# Patient Record
Sex: MALE | Race: WHITE | NOT HISPANIC OR LATINO | URBAN - METROPOLITAN AREA
[De-identification: names, ages, dates, MRNs, and addresses within clinical notes are randomized per-mention and may not be internally consistent; named-entity substitution may affect disease eponyms.]

---

## 2017-10-05 ENCOUNTER — OFFICE VISIT - HEALTHEAST (OUTPATIENT)
Dept: GERIATRICS | Facility: CLINIC | Age: 67
End: 2017-10-05

## 2017-10-05 ENCOUNTER — AMBULATORY - HEALTHEAST (OUTPATIENT)
Dept: ADMINISTRATIVE | Facility: CLINIC | Age: 67
End: 2017-10-05

## 2017-10-05 DIAGNOSIS — I46.9 CARDIAC ARREST (H): ICD-10-CM

## 2017-10-05 DIAGNOSIS — E87.5 HYPERKALEMIA: ICD-10-CM

## 2017-10-05 DIAGNOSIS — J69.0 ASPIRATION PNEUMONIA, UNSPECIFIED ASPIRATION PNEUMONIA TYPE, UNSPECIFIED LATERALITY, UNSPECIFIED PART OF LUNG (H): ICD-10-CM

## 2017-10-05 DIAGNOSIS — D63.1 ANEMIA OF CHRONIC RENAL FAILURE, UNSPECIFIED STAGE: ICD-10-CM

## 2017-10-05 DIAGNOSIS — I50.33 ACUTE ON CHRONIC DIASTOLIC CONGESTIVE HEART FAILURE (H): ICD-10-CM

## 2017-10-05 DIAGNOSIS — J96.21 ACUTE ON CHRONIC RESPIRATORY FAILURE WITH HYPOXIA (H): ICD-10-CM

## 2017-10-05 DIAGNOSIS — I71.40 ABDOMINAL AORTIC ANEURYSM (AAA) WITHOUT RUPTURE (H): ICD-10-CM

## 2017-10-05 DIAGNOSIS — F10.10 ALCOHOL ABUSE: ICD-10-CM

## 2017-10-05 DIAGNOSIS — R73.9 HYPERGLYCEMIA, DRUG-INDUCED: ICD-10-CM

## 2017-10-05 DIAGNOSIS — M06.9 RHEUMATOID ARTHRITIS INVOLVING BOTH HANDS, UNSPECIFIED RHEUMATOID FACTOR PRESENCE: ICD-10-CM

## 2017-10-05 DIAGNOSIS — J95.851 VENTILATOR ASSOCIATED PNEUMONIA (H): ICD-10-CM

## 2017-10-05 DIAGNOSIS — N18.30 STAGE 3 CHRONIC KIDNEY DISEASE (H): ICD-10-CM

## 2017-10-05 DIAGNOSIS — I10 ESSENTIAL HYPERTENSION: ICD-10-CM

## 2017-10-05 DIAGNOSIS — T50.905A HYPERGLYCEMIA, DRUG-INDUCED: ICD-10-CM

## 2017-10-05 DIAGNOSIS — N18.9 ANEMIA OF CHRONIC RENAL FAILURE, UNSPECIFIED STAGE: ICD-10-CM

## 2017-10-05 DIAGNOSIS — M10.00 IDIOPATHIC GOUT: ICD-10-CM

## 2017-10-05 RX ORDER — POLYETHYLENE GLYCOL 3350 17 G/17G
17 POWDER, FOR SOLUTION ORAL DAILY
Status: SHIPPED | COMMUNITY
Start: 2017-10-05

## 2017-10-05 RX ORDER — MAGNESIUM OXIDE 400 MG/1
400 TABLET ORAL 3 TIMES DAILY
Status: SHIPPED | COMMUNITY
Start: 2017-10-05

## 2017-10-05 RX ORDER — METOPROLOL TARTRATE 50 MG
50 TABLET ORAL 2 TIMES DAILY
Status: SHIPPED | COMMUNITY
Start: 2017-10-05

## 2017-10-05 RX ORDER — SIMVASTATIN 20 MG
20 TABLET ORAL AT BEDTIME
Status: SHIPPED | COMMUNITY
Start: 2017-10-05

## 2017-10-06 ENCOUNTER — AMBULATORY - HEALTHEAST (OUTPATIENT)
Dept: ADMINISTRATIVE | Facility: CLINIC | Age: 67
End: 2017-10-06

## 2017-10-10 ENCOUNTER — OFFICE VISIT - HEALTHEAST (OUTPATIENT)
Dept: GERIATRICS | Facility: CLINIC | Age: 67
End: 2017-10-10

## 2017-10-10 DIAGNOSIS — R73.9 HYPERGLYCEMIA, DRUG-INDUCED: ICD-10-CM

## 2017-10-10 DIAGNOSIS — J96.21 ACUTE ON CHRONIC RESPIRATORY FAILURE WITH HYPOXIA (H): ICD-10-CM

## 2017-10-10 DIAGNOSIS — T50.905A HYPERGLYCEMIA, DRUG-INDUCED: ICD-10-CM

## 2017-10-10 DIAGNOSIS — J95.851 VENTILATOR ASSOCIATED PNEUMONIA (H): ICD-10-CM

## 2017-10-10 DIAGNOSIS — N18.9 ANEMIA OF CHRONIC RENAL FAILURE, UNSPECIFIED STAGE: ICD-10-CM

## 2017-10-10 DIAGNOSIS — I10 ESSENTIAL HYPERTENSION: ICD-10-CM

## 2017-10-10 DIAGNOSIS — E87.5 HYPERKALEMIA: ICD-10-CM

## 2017-10-10 DIAGNOSIS — D63.1 ANEMIA OF CHRONIC RENAL FAILURE, UNSPECIFIED STAGE: ICD-10-CM

## 2017-10-10 RX ORDER — BUMETANIDE 1 MG/1
1 TABLET ORAL DAILY
Status: SHIPPED | COMMUNITY
Start: 2017-10-10

## 2017-10-10 RX ORDER — MULTIPLE VITAMINS W/ MINERALS TAB 9MG-400MCG
1 TAB ORAL DAILY
Status: SHIPPED | COMMUNITY
Start: 2017-10-10

## 2017-10-10 RX ORDER — ALLOPURINOL 300 MG/1
450 TABLET ORAL DAILY
Status: SHIPPED | COMMUNITY
Start: 2017-10-10

## 2017-10-12 ENCOUNTER — OFFICE VISIT - HEALTHEAST (OUTPATIENT)
Dept: GERIATRICS | Facility: CLINIC | Age: 67
End: 2017-10-12

## 2017-10-12 DIAGNOSIS — I71.40 ABDOMINAL AORTIC ANEURYSM (AAA) WITHOUT RUPTURE (H): ICD-10-CM

## 2017-10-12 DIAGNOSIS — M10.00 IDIOPATHIC GOUT: ICD-10-CM

## 2017-10-12 DIAGNOSIS — M06.9 RHEUMATOID ARTHRITIS INVOLVING BOTH HANDS, UNSPECIFIED RHEUMATOID FACTOR PRESENCE: ICD-10-CM

## 2017-10-12 DIAGNOSIS — E87.5 HYPERKALEMIA: ICD-10-CM

## 2017-10-12 DIAGNOSIS — F10.10 ALCOHOL ABUSE: ICD-10-CM

## 2017-10-12 DIAGNOSIS — T50.905A HYPERGLYCEMIA, DRUG-INDUCED: ICD-10-CM

## 2017-10-12 DIAGNOSIS — N18.30 STAGE 3 CHRONIC KIDNEY DISEASE (H): ICD-10-CM

## 2017-10-12 DIAGNOSIS — I50.33 ACUTE ON CHRONIC DIASTOLIC CONGESTIVE HEART FAILURE (H): ICD-10-CM

## 2017-10-12 DIAGNOSIS — J69.0 ASPIRATION PNEUMONIA, UNSPECIFIED ASPIRATION PNEUMONIA TYPE, UNSPECIFIED LATERALITY, UNSPECIFIED PART OF LUNG (H): ICD-10-CM

## 2017-10-12 DIAGNOSIS — R73.9 HYPERGLYCEMIA, DRUG-INDUCED: ICD-10-CM

## 2017-10-12 DIAGNOSIS — J95.851 VENTILATOR ASSOCIATED PNEUMONIA (H): ICD-10-CM

## 2017-10-12 DIAGNOSIS — J96.21 ACUTE ON CHRONIC RESPIRATORY FAILURE WITH HYPOXIA (H): ICD-10-CM

## 2017-10-12 DIAGNOSIS — N18.9 ANEMIA OF CHRONIC RENAL FAILURE, UNSPECIFIED STAGE: ICD-10-CM

## 2017-10-12 DIAGNOSIS — I10 ESSENTIAL HYPERTENSION: ICD-10-CM

## 2017-10-12 DIAGNOSIS — D63.1 ANEMIA OF CHRONIC RENAL FAILURE, UNSPECIFIED STAGE: ICD-10-CM

## 2017-10-12 DIAGNOSIS — I46.9 CARDIAC ARREST (H): ICD-10-CM

## 2017-10-17 ENCOUNTER — OFFICE VISIT - HEALTHEAST (OUTPATIENT)
Dept: GERIATRICS | Facility: CLINIC | Age: 67
End: 2017-10-17

## 2017-10-17 DIAGNOSIS — J69.0 ASPIRATION PNEUMONIA, UNSPECIFIED ASPIRATION PNEUMONIA TYPE, UNSPECIFIED LATERALITY, UNSPECIFIED PART OF LUNG (H): ICD-10-CM

## 2017-10-17 DIAGNOSIS — R73.9 HYPERGLYCEMIA, DRUG-INDUCED: ICD-10-CM

## 2017-10-17 DIAGNOSIS — N18.9 ANEMIA OF CHRONIC RENAL FAILURE, UNSPECIFIED STAGE: ICD-10-CM

## 2017-10-17 DIAGNOSIS — J96.21 ACUTE ON CHRONIC RESPIRATORY FAILURE WITH HYPOXIA (H): ICD-10-CM

## 2017-10-17 DIAGNOSIS — I50.33 ACUTE ON CHRONIC DIASTOLIC CONGESTIVE HEART FAILURE (H): ICD-10-CM

## 2017-10-17 DIAGNOSIS — N18.30 STAGE 3 CHRONIC KIDNEY DISEASE (H): ICD-10-CM

## 2017-10-17 DIAGNOSIS — D63.1 ANEMIA OF CHRONIC RENAL FAILURE, UNSPECIFIED STAGE: ICD-10-CM

## 2017-10-17 DIAGNOSIS — T50.905A HYPERGLYCEMIA, DRUG-INDUCED: ICD-10-CM

## 2017-10-19 ENCOUNTER — OFFICE VISIT - HEALTHEAST (OUTPATIENT)
Dept: GERIATRICS | Facility: CLINIC | Age: 67
End: 2017-10-19

## 2017-10-19 DIAGNOSIS — I71.40 ABDOMINAL AORTIC ANEURYSM (AAA) WITHOUT RUPTURE (H): ICD-10-CM

## 2017-10-19 DIAGNOSIS — I46.9 CARDIAC ARREST (H): ICD-10-CM

## 2017-10-19 DIAGNOSIS — I50.33 ACUTE ON CHRONIC DIASTOLIC CONGESTIVE HEART FAILURE (H): ICD-10-CM

## 2017-10-19 DIAGNOSIS — D63.1 ANEMIA OF CHRONIC RENAL FAILURE, UNSPECIFIED STAGE: ICD-10-CM

## 2017-10-19 DIAGNOSIS — N18.9 ANEMIA OF CHRONIC RENAL FAILURE, UNSPECIFIED STAGE: ICD-10-CM

## 2017-10-19 DIAGNOSIS — J95.851 VENTILATOR ASSOCIATED PNEUMONIA (H): ICD-10-CM

## 2017-10-19 DIAGNOSIS — E87.5 HYPERKALEMIA: ICD-10-CM

## 2017-10-19 DIAGNOSIS — F10.10 ALCOHOL ABUSE: ICD-10-CM

## 2017-10-19 DIAGNOSIS — R73.9 HYPERGLYCEMIA, DRUG-INDUCED: ICD-10-CM

## 2017-10-19 DIAGNOSIS — M06.9 RHEUMATOID ARTHRITIS INVOLVING BOTH HANDS, UNSPECIFIED RHEUMATOID FACTOR PRESENCE: ICD-10-CM

## 2017-10-19 DIAGNOSIS — N18.30 STAGE 3 CHRONIC KIDNEY DISEASE (H): ICD-10-CM

## 2017-10-19 DIAGNOSIS — M10.00 IDIOPATHIC GOUT: ICD-10-CM

## 2017-10-19 DIAGNOSIS — I10 ESSENTIAL HYPERTENSION: ICD-10-CM

## 2017-10-19 DIAGNOSIS — T50.905A HYPERGLYCEMIA, DRUG-INDUCED: ICD-10-CM

## 2017-10-19 DIAGNOSIS — J96.21 ACUTE ON CHRONIC RESPIRATORY FAILURE WITH HYPOXIA (H): ICD-10-CM

## 2017-10-24 ENCOUNTER — OFFICE VISIT - HEALTHEAST (OUTPATIENT)
Dept: GERIATRICS | Facility: CLINIC | Age: 67
End: 2017-10-24

## 2017-10-24 DIAGNOSIS — I46.9 CARDIAC ARREST (H): ICD-10-CM

## 2017-10-24 DIAGNOSIS — J69.0 ASPIRATION PNEUMONIA, UNSPECIFIED ASPIRATION PNEUMONIA TYPE, UNSPECIFIED LATERALITY, UNSPECIFIED PART OF LUNG (H): ICD-10-CM

## 2017-10-24 DIAGNOSIS — N18.9 ANEMIA OF CHRONIC RENAL FAILURE, UNSPECIFIED STAGE: ICD-10-CM

## 2017-10-24 DIAGNOSIS — D63.1 ANEMIA OF CHRONIC RENAL FAILURE, UNSPECIFIED STAGE: ICD-10-CM

## 2017-10-24 DIAGNOSIS — N18.30 STAGE 3 CHRONIC KIDNEY DISEASE (H): ICD-10-CM

## 2017-10-24 DIAGNOSIS — I10 ESSENTIAL HYPERTENSION: ICD-10-CM

## 2017-10-25 ENCOUNTER — OFFICE VISIT - HEALTHEAST (OUTPATIENT)
Dept: GERIATRICS | Facility: CLINIC | Age: 67
End: 2017-10-25

## 2017-10-25 DIAGNOSIS — I50.33 ACUTE ON CHRONIC DIASTOLIC CONGESTIVE HEART FAILURE (H): ICD-10-CM

## 2017-10-25 DIAGNOSIS — I10 ESSENTIAL HYPERTENSION: ICD-10-CM

## 2017-10-25 DIAGNOSIS — F10.10 ALCOHOL ABUSE: ICD-10-CM

## 2017-10-25 DIAGNOSIS — N18.9 ANEMIA OF CHRONIC RENAL FAILURE, UNSPECIFIED STAGE: ICD-10-CM

## 2017-10-25 DIAGNOSIS — M10.00 IDIOPATHIC GOUT: ICD-10-CM

## 2017-10-25 DIAGNOSIS — J95.851 VENTILATOR ASSOCIATED PNEUMONIA (H): ICD-10-CM

## 2017-10-25 DIAGNOSIS — I46.9 CARDIAC ARREST (H): ICD-10-CM

## 2017-10-25 DIAGNOSIS — J69.0 ASPIRATION PNEUMONIA, UNSPECIFIED ASPIRATION PNEUMONIA TYPE, UNSPECIFIED LATERALITY, UNSPECIFIED PART OF LUNG (H): ICD-10-CM

## 2017-10-25 DIAGNOSIS — D63.1 ANEMIA OF CHRONIC RENAL FAILURE, UNSPECIFIED STAGE: ICD-10-CM

## 2017-10-25 DIAGNOSIS — M06.9 RHEUMATOID ARTHRITIS INVOLVING BOTH HANDS, UNSPECIFIED RHEUMATOID FACTOR PRESENCE: ICD-10-CM

## 2017-10-25 DIAGNOSIS — I71.40 ABDOMINAL AORTIC ANEURYSM (AAA) WITHOUT RUPTURE (H): ICD-10-CM

## 2017-10-25 DIAGNOSIS — N18.30 STAGE 3 CHRONIC KIDNEY DISEASE (H): ICD-10-CM

## 2017-10-27 ENCOUNTER — OFFICE VISIT - HEALTHEAST (OUTPATIENT)
Dept: GERIATRICS | Facility: CLINIC | Age: 67
End: 2017-10-27

## 2017-10-27 DIAGNOSIS — I10 ESSENTIAL HYPERTENSION: ICD-10-CM

## 2017-10-27 DIAGNOSIS — M10.00 IDIOPATHIC GOUT: ICD-10-CM

## 2017-10-27 DIAGNOSIS — D63.1 ANEMIA OF CHRONIC RENAL FAILURE, UNSPECIFIED STAGE: ICD-10-CM

## 2017-10-27 DIAGNOSIS — N18.9 ANEMIA OF CHRONIC RENAL FAILURE, UNSPECIFIED STAGE: ICD-10-CM

## 2017-10-27 DIAGNOSIS — N18.30 STAGE 3 CHRONIC KIDNEY DISEASE (H): ICD-10-CM

## 2017-10-27 DIAGNOSIS — I50.33 ACUTE ON CHRONIC DIASTOLIC CONGESTIVE HEART FAILURE (H): ICD-10-CM

## 2017-10-31 ENCOUNTER — OFFICE VISIT - HEALTHEAST (OUTPATIENT)
Dept: GERIATRICS | Facility: CLINIC | Age: 67
End: 2017-10-31

## 2017-10-31 DIAGNOSIS — N18.30 STAGE 3 CHRONIC KIDNEY DISEASE (H): ICD-10-CM

## 2017-10-31 DIAGNOSIS — M06.9 RHEUMATOID ARTHRITIS INVOLVING BOTH HANDS, UNSPECIFIED RHEUMATOID FACTOR PRESENCE: ICD-10-CM

## 2017-10-31 DIAGNOSIS — I10 ESSENTIAL HYPERTENSION: ICD-10-CM

## 2017-10-31 DIAGNOSIS — N18.9 ANEMIA OF CHRONIC RENAL FAILURE, UNSPECIFIED STAGE: ICD-10-CM

## 2017-10-31 DIAGNOSIS — I46.9 CARDIAC ARREST (H): ICD-10-CM

## 2017-10-31 DIAGNOSIS — D63.1 ANEMIA OF CHRONIC RENAL FAILURE, UNSPECIFIED STAGE: ICD-10-CM

## 2017-10-31 DIAGNOSIS — J69.0 ASPIRATION PNEUMONIA, UNSPECIFIED ASPIRATION PNEUMONIA TYPE, UNSPECIFIED LATERALITY, UNSPECIFIED PART OF LUNG (H): ICD-10-CM

## 2017-10-31 DIAGNOSIS — I71.40 ABDOMINAL AORTIC ANEURYSM (AAA) WITHOUT RUPTURE (H): ICD-10-CM

## 2017-10-31 DIAGNOSIS — F10.10 ALCOHOL ABUSE: ICD-10-CM

## 2017-10-31 DIAGNOSIS — M10.00 IDIOPATHIC GOUT: ICD-10-CM

## 2017-10-31 DIAGNOSIS — J95.851 VENTILATOR ASSOCIATED PNEUMONIA (H): ICD-10-CM

## 2017-10-31 DIAGNOSIS — I50.33 ACUTE ON CHRONIC DIASTOLIC CONGESTIVE HEART FAILURE (H): ICD-10-CM

## 2017-11-02 ENCOUNTER — AMBULATORY - HEALTHEAST (OUTPATIENT)
Dept: GERIATRICS | Facility: CLINIC | Age: 67
End: 2017-11-02

## 2021-05-31 ENCOUNTER — RECORDS - HEALTHEAST (OUTPATIENT)
Dept: ADMINISTRATIVE | Facility: CLINIC | Age: 71
End: 2021-05-31

## 2021-05-31 VITALS — WEIGHT: 227.6 LBS

## 2021-05-31 VITALS — WEIGHT: 220.4 LBS

## 2021-05-31 VITALS — WEIGHT: 222.8 LBS

## 2021-05-31 VITALS — WEIGHT: 223 LBS

## 2021-05-31 VITALS — WEIGHT: 223.6 LBS

## 2021-05-31 VITALS — WEIGHT: 222.6 LBS

## 2021-05-31 VITALS — WEIGHT: 221.8 LBS

## 2021-06-13 NOTE — PROGRESS NOTES
[facility]    DATE: 10/25/2017    NAME: Shai Gibbs  : 1950           MR# [id number]     CODE STATUS:  DNR      VISIT TYPE: Problem   FACILITY: [facility] [[id number]]    ROOM: 307    PRIMARY CARE PROVIDER: Alesia Primary Care Provider Phone: None Fax:[phone]    History of Present Illness:   Shai Gibbs is a 67 y.o. male with History of a cardiac arrest with prolonged bed rest now in the process of reconditioning to resume his normal activity.. Is fixed on discharge early November and although it certainly is possible that  More extensive aggressive therapy would be helpful for him more prolonged outpatient therapy also would be adequate. He denies any ongoing symptoms with the exception of lack of endurance. Both he and therapy report that he can do anything requested of him  But not over an extended period of time.. We had a Nice long discussion With regard to long-term rehabilitation to try to maximize his recovery, and he is totally committed to whatever it takes to resume walking  With the goal of no Walker after the first of the year.    Past Medical History:  Past Medical History:   Diagnosis Date     AAA (abdominal aortic aneurysm)      JEOVANNY (acute kidney injury)      Alcohol abuse      Anemia      Anemia of chronic renal failure, unspecified stage      ARDS (adult respiratory distress syndrome)      Arthritis, rheumatoid      Aspiration pneumonia      Cardiac arrest      Chronic kidney disease      Essential hypertension      Gout      Hyperglycemia, drug-induced     steroids     Hyperkalemia      Hyperlipemia      Hypotension      Idiopathic gout      Morbid obesity      Red blood cell antibody positive      Respiratory failure      Shock      Stage 3 chronic kidney disease      Ventilator associated pneumonia        Allergies:  No Known Allergies    Current Medications:  Current Outpatient Prescriptions   Medication Sig     acetaminophen (TYLENOL)  650 mg/20.3 mL Soln 650 mg by G-tube route. solution; 650 mg/20.3 mL; amt: 650 mg/20.3 mL; gastric tube  Special Instructions: Mild pain (pain scale 1-3)/ fever  Every 4 Hours - PRN; PRN 1, PRN 2, PRN 3, PRN 4, PRN 5, PRN 6     allopurinol (ZYLOPRIM) 300 MG tablet Take 450 mg by mouth daily.     bumetanide (BUMEX) 1 MG tablet Take 1 mg by mouth daily.     calcium acetate (PHOSLYRA) 667 mg (169 mg calcium)/5 mL Soln solution 1,334 mg by G-tube route 3 (three) times a day with meals. Special Instructions: Take with meals.  Three Times A Day; 08:00 AM, 12:00 PM, 05:00 PM      carboxymethylcellulose sodium (REFRESH CELLUVISC) 1 % DpGe Administer to both eyes. Special Instructions: Administer 2 drops to each eye PRN.  Every Hour - PRN; PRN 1, PRN 2, PRN 3, PRN 4, PRN 5, PRN 6, PRN 7, PRN  8, PRN 9, PRN 10, PRN 11, PRN 12, PRN 13, PRN 14, PRN 15, PRN 16,  PRN 17, PRN 18, PRN 19, PRN 20, PRN 21, PRN 22, PRN 23, PRN 24     cholecalciferol, vitamin D3, 1,000 unit tablet Take 1,000 Units by mouth daily.     cyanocobalamin 1000 MCG tablet Take 1,000 mcg by mouth daily.     docusate sodium (COLACE) 50 mg/5 mL oral liquid 100 mg by G-tube route 2 (two) times a day. docusate sodium [OTC]  liquid; 50 mg/5 mL; amt: 100 mg/10 mL; gastric tube  Twice A Day; 08:00 AM, 08:00 PM     epoetin delgado (EPOGEN,PROCRIT) 20,000 unit/mL injection Inject 7,500 Units under the skin once a week in the evening. Hold if HGB greater than 9. Check HGB once a week  For 4 weeks on Monday-     Medication once a day on Monday and Thursday     magnesium oxide (MAG-OX) 400 mg tablet 400 mg by G-tube route 3 (three) times a day.     melatonin 3 mg Tab tablet Take 6 mg by mouth at bedtime as needed.      metoprolol tartrate (LOPRESSOR) 50 MG tablet 50 mg by G-tube route 2 (two) times a day. Special Instructions: Hold for SBP <80  Twice A Day; 08:00 AM, 08:00 PM     multivitamin with minerals (THERA-M) 9 mg iron-400 mcg Tab tablet Take 1 tablet by mouth daily.      omeprazole (PRILOSEC OTC) 20 MG tablet Take 20 mg by mouth daily before breakfast.     ondansetron (ZOFRAN) 4 MG tablet Take 4 mg by mouth every 8 (eight) hours as needed for nausea.      polyethylene glycol (MIRALAX) 17 gram packet Take 17 g by mouth daily. Special Instructions: First choice for everyday PRN constipation.  Once A Day - PRN; PRN 1     potassium chloride (KLOR-CON) 20 mEq packet Take 40 mEq by mouth daily.     predniSONE (DELTASONE) 5 MG tablet Take 5 mg by mouth daily.     senna (SENOKOT) 8.6 mg tablet 2 tablets by G-tube route 2 (two) times a day.     simvastatin (ZOCOR) 20 MG tablet 20 mg by G-tube route at bedtime.       Review of Systems:  History obtained from chart review and the patient  Respiratory ROS: no cough, shortness of breath, or wheezing  Cardiovascular ROS: no chest pain or dyspnea on exertion  Gastrointestinal ROS: no abdominal pain, change in bowel habits, or black or bloody stools  Genito-Urinary ROS: no dysuria, trouble voiding, or hematuria  Musculoskeletal ROS: Gradually increasing stink although lacking endurance  Neurological ROS: no TIA or stroke symptoms  Dermatological ROS: he denies any open skin lesions         Laboratory:  No results for input(s): INR in the last 72 hours.  None    Physical Examination:  /75  Pulse (!) 115  Temp 98.7  F (37.1  C)  Resp 18  Wt 221 lb 12.8 oz (100.6 kg)  SpO2 98%  General appearance: alert, appears stated age, cooperative, flushed, no distress and moderately obese  Neck: no adenopathy, no carotid bruit, no JVD, supple, symmetrical, trachea midline and thyroid not enlarged, symmetric, no tenderness/mass/nodules  Lungs: clear to auscultation bilaterally  Heart: regular rate and rhythm, S1, S2 normal, no murmur, click, rub or gallop  Abdomen: soft, non-tender; bowel sounds normal; no masses,  no organomegaly  Extremities: extremities normal, atraumatic, no cyanosis or edema  Pulses: 2+ and symmetric  Skin: Skin color,  texture, turgor normal. No rashes or lesions  Neurologic: Mental status: Alert, oriented, thought content appropriate  Motor:full range of motion  but diminished endurance for repetitive tasks       Impression:  Shai Gibbs is a 67 y.o. male with History of   cardiac arrest and prolonged recovery now being reconditioned for life outside of transitional care    1. Cardiac arrest     2. Ventilator associated pneumonia     3. Aspiration pneumonia, unspecified aspiration pneumonia type, unspecified laterality, unspecified part of lung     4. Acute on chronic diastolic congestive heart failure     5. Idiopathic gout     6. Anemia of chronic renal failure, unspecified stage     7. Alcohol abuse     8. Rheumatoid arthritis involving both hands, unspecified rheumatoid factor presence     9. Stage 3 chronic kidney disease     10. Essential hypertension     11. Abdominal aortic aneurysm (AAA) without rupture         Plan: Continue aggressive rehabilitation while here and schedule prolonged therapy as an outpatient. Although the intensity will be lacking persistence would be helpful    Electronically signed by: Carter Andersen Sr., MD

## 2021-06-13 NOTE — PROGRESS NOTES
[facility]    DATE: 10/19/2017    NAME: Shai Gibbs  : 1950           MR# [id number]     CODE STATUS:  DNR      VISIT TYPE: Problem   FACILITY: [facility] [[id number]]    ROOM: 307    PRIMARY CARE PROVIDER: No Primary Care Provider Phone: None Fax:[phone]    History of Present Illness:   Shai Gibbs is a 67 y.o. male with History of a cardiac arrest rated to septicemia and hyperkalemia with the prolonged hospitalization. He's gradually gaining strength he volunteers today that his wife spent doing blood sugars from from home and he's been a little bit on the low side volunteering in his 80s and 110's.. We will get some blood sugars to see if his prednisone is indeed affecting his blood sugars. Overall he feels steady improvement. He said when he was constipated his pulse was up but it's back into the   low 100s at this point. He feels he's eating and drinking well and vital signs as noted below are certainly adequate with the exception of the tachycardia..  Recheck today show his shows his pulse  At 103,And this during exercise.    Past Medical History:  Past Medical History:   Diagnosis Date     AAA (abdominal aortic aneurysm)      JEOVANNY (acute kidney injury)      Alcohol abuse      Anemia      Anemia of chronic renal failure, unspecified stage      ARDS (adult respiratory distress syndrome)      Arthritis, rheumatoid      Aspiration pneumonia      Cardiac arrest      Chronic kidney disease      Essential hypertension      Gout      Hyperglycemia, drug-induced     steroids     Hyperkalemia      Hyperlipemia      Hypotension      Idiopathic gout      Morbid obesity      Red blood cell antibody positive      Respiratory failure      Shock      Stage 3 chronic kidney disease      Ventilator associated pneumonia        Allergies:  No Known Allergies    Current Medications:  Current Outpatient Prescriptions   Medication Sig     acetaminophen (TYLENOL) 650  mg/20.3 mL Soln 650 mg by G-tube route. solution; 650 mg/20.3 mL; amt: 650 mg/20.3 mL; gastric tube  Special Instructions: Mild pain (pain scale 1-3)/ fever  Every 4 Hours - PRN; PRN 1, PRN 2, PRN 3, PRN 4, PRN 5, PRN 6     allopurinol (ZYLOPRIM) 300 MG tablet Take 450 mg by mouth daily.     bumetanide (BUMEX) 1 MG tablet Take 1 mg by mouth daily.     calcium acetate (PHOSLYRA) 667 mg (169 mg calcium)/5 mL Soln solution 1,334 mg by G-tube route 3 (three) times a day with meals. Special Instructions: Take with meals.  Three Times A Day; 08:00 AM, 12:00 PM, 05:00 PM      carboxymethylcellulose sodium (REFRESH CELLUVISC) 1 % DpGe Administer to both eyes. Special Instructions: Administer 2 drops to each eye PRN.  Every Hour - PRN; PRN 1, PRN 2, PRN 3, PRN 4, PRN 5, PRN 6, PRN 7, PRN  8, PRN 9, PRN 10, PRN 11, PRN 12, PRN 13, PRN 14, PRN 15, PRN 16,  PRN 17, PRN 18, PRN 19, PRN 20, PRN 21, PRN 22, PRN 23, PRN 24     cholecalciferol, vitamin D3, 1,000 unit tablet Take 1,000 Units by mouth daily.     cyanocobalamin 1000 MCG tablet Take 1,000 mcg by mouth daily.     docusate sodium (COLACE) 50 mg/5 mL oral liquid 100 mg by G-tube route 2 (two) times a day. docusate sodium [OTC]  liquid; 50 mg/5 mL; amt: 100 mg/10 mL; gastric tube  Twice A Day; 08:00 AM, 08:00 PM     epoetin delgado (EPOGEN,PROCRIT) 20,000 unit/mL injection Inject 7,500 Units under the skin once a week in the evening. Hold if HGB greater than 9. Check HGB once a week  For 4 weeks on Monday-     Medication once a day on Monday and Thursday     hypromellose (NATURES TEARS) Drop 1 drop 2 (two) times a day as needed.     magnesium oxide (MAG-OX) 400 mg tablet 400 mg by G-tube route 3 (three) times a day.     melatonin 3 mg Tab tablet Take 6 mg by mouth at bedtime as needed.      metoprolol tartrate (LOPRESSOR) 50 MG tablet 50 mg by G-tube route 2 (two) times a day. Special Instructions: Hold for SBP <80  Twice A Day; 08:00 AM, 08:00 PM     multivitamin with  minerals (THERA-M) 9 mg iron-400 mcg Tab tablet Take 1 tablet by mouth daily.     omeprazole (PRILOSEC OTC) 20 MG tablet Take 20 mg by mouth daily before breakfast.     ondansetron (ZOFRAN) 4 MG tablet Take 4 mg by mouth every 8 (eight) hours as needed for nausea.      oxyCODONE (ROXICODONE) 5 MG immediate release tablet 10 mg by G-tube route every 4 (four) hours as needed for pain. tablet; 5 mg; amt: 10 mg; gastric tube  Special Instructions: For moderate to severe pain (pain scale 4-10)  Every 4 Hours - PRN; PRN 1, PRN 2, PRN 3, PRN 4, PRN 5, PRN 6     polyethylene glycol (MIRALAX) 17 gram packet Take 17 g by mouth daily. Special Instructions: First choice for everyday PRN constipation.  Once A Day - PRN; PRN 1     potassium chloride (KLOR-CON) 20 mEq packet Take 40 mEq by mouth daily.     predniSONE (DELTASONE) 10 mg tablet Take 10 mg by mouth daily.     [START ON 10/20/2017] predniSONE (DELTASONE) 5 MG tablet Take 5 mg by mouth daily.     senna (SENOKOT) 8.6 mg tablet 2 tablets by G-tube route 2 (two) times a day.     simvastatin (ZOCOR) 20 MG tablet 20 mg by G-tube route at bedtime.       Review of Systems:  History obtained from chart review and the patient  Respiratory ROS: no cough, shortness of breath, or wheezing  Cardiovascular ROS: no chest pain or dyspnea on exertion  Gastrointestinal ROS: no abdominal pain, change in bowel habits, or black or bloody stools  Genito-Urinary ROS: no dysuria, trouble voiding, or hematuria  Musculoskeletal ROS: Greatly improved strength with steady improvement as the days go on  Neurological ROS: no TIA or stroke symptoms  Dermatological ROS: denies open skin lesions         Laboratory:  No results for input(s): INR in the last 72 hours.  None but will do some random blood sugars over the weekend    Physical Examination:  /76  Pulse (!) 127  Temp 98.1  F (36.7  C)  Resp 18  Wt (!) 222 lb 9.6 oz (101 kg)  SpO2 96%  General appearance: alert, appears stated age,  cooperative, flushed, no distress and mildly obese  Neck: no adenopathy, no carotid bruit, no JVD, supple, symmetrical, trachea midline and thyroid not enlarged, symmetric, no tenderness/mass/nodules  Chest is clear to auscultation and percussion heart regular without murmur abdomen obese nontender no hepatomegaly Or splenomegaly normal bowel soundsExtremities show good peripheral pulses full range of motion although globally diminished strength he is quite alert oriented and appropriate    Impression:  Shai Gibbs is a 67 y.o. male with Prolonged recovery after cardiac arrest related to septicemia and hyperkalemia    1. Cardiac arrest     2. Stage 3 chronic kidney disease     3. Acute on chronic respiratory failure with hypoxia     4. Hyperkalemia     5. Idiopathic gout     6. Ventilator associated pneumonia     7. Abdominal aortic aneurysm (AAA) without rupture     8. Anemia of chronic renal failure, unspecified stage     9. Essential hypertension     10. Rheumatoid arthritis involving both hands, unspecified rheumatoid factor presence     11. Hyperglycemia, drug-induced     12. Acute on chronic diastolic congestive heart failure     13. Alcohol abuse         Plan: Continue to push for physical activity is weaning off the steroids which I think is a good plan and we will keep track of his blood sugars. His hope is for November 1 discharge and my hope is that we can help them achieve that goal    Electronically signed by: Carter Andersen Sr., MD

## 2021-06-13 NOTE — PROGRESS NOTES
[facility]    Facility:   [facility] [[id number]]   Code Status: DNR      CHIEF COMPLAINT/REASON FOR VISIT:  Chief Complaint   Patient presents with     Review Of Multiple Medical Conditions       HISTORY:      HPI: Shai Gibbs is a 67 y.o. male with history of septicemia related to urinary tract infection which led to adult respiratory distress syndrome cardiac arrest hyperkalemia and approximately a two month hospitalization..  He has recovered sufficiently from the acute issues to be admitted to the transitional care unit back in [place] as opposed to [place] where he was on vacation. He is a retired  and  who worked for [employer]. He is planning on early discharge hopefully and planning on being at a family wedding mid-November. He has been through renal failure with constant then intermittent and now discontinued dialysis. He had respiratory failure regarding tracheostomy which is now been removed and incision is healed.  He has a feeding gastrostomy left in place but is almost entirely on oral intake and getting supplements at night. His appetite has improved and he is being followed by the dieticain. He  currently completed  a 3 day calorie count.  Today he is seen in his room. He denies CP or SOB. He is making progress in therapy. His fingersticks have been reviewed and have been stable on the prednisone taper. Lantus was dc'd and FS have been stable and stopped. Per therapy he is on target for discharging early November.  BP and pulses reviewed and he has been tachy prior to metoprolol and coming down to the high 90's low 100's, BP;s have been low, He denies CP.  Past Medical History:   Diagnosis Date     AAA (abdominal aortic aneurysm)      JEOVANNY (acute kidney injury)      Alcohol abuse      Anemia      Anemia of chronic renal failure, unspecified stage      ARDS (adult respiratory distress syndrome)      Arthritis, rheumatoid       Aspiration pneumonia      Cardiac arrest      Chronic kidney disease      Essential hypertension      Gout      Hyperglycemia, drug-induced     steroids     Hyperkalemia      Hyperlipemia      Hypotension      Idiopathic gout      Morbid obesity      Red blood cell antibody positive      Respiratory failure      Shock      Stage 3 chronic kidney disease      Ventilator associated pneumonia              Family History   Problem Relation Age of Onset     Family history unknown: Yes     Social History     Social History     Marital status:      Spouse name: N/A     Number of children: N/A     Years of education: N/A     Occupational History     [employer]      Retired,  and manager     Social History Main Topics     Smoking status: Never Smoker     Smokeless tobacco: Never Used     Alcohol use No     Drug use: Not on file     Sexual activity: Not on file     Other Topics Concern     Not on file     Social History Narrative     with 3 adult children.  Adult son lives with father and mother in split level home with 12 steps.         Review of Systems   Constitutional: Positive for activity change and fatigue. Negative for appetite change, chills and fever.        Completed a 3 day calorie count- appetite improving, Continues on supplement   HENT: Negative for congestion and sore throat.    Eyes: Negative for visual disturbance.   Respiratory: Negative for cough, shortness of breath and wheezing.    Cardiovascular: Negative for chest pain and leg swelling.   Gastrointestinal: Negative for abdominal distention, abdominal pain, constipation, diarrhea and nausea.   Genitourinary: Negative for dysuria.   Musculoskeletal: Positive for arthralgias and joint swelling. Negative for myalgias.   Skin: Negative for color change, rash and wound.   Neurological: Negative for dizziness, weakness and numbness.   Psychiatric/Behavioral: Negative for agitation, behavioral problems and sleep disturbance.        .  Vitals:    10/17/17 1801   BP: 110/74   Pulse: 97   Resp: 19   Temp: 98.7  F (37.1  C)   SpO2: 95%   Weight: (!) 222 lb 9.6 oz (101 kg)       Physical Exam   Constitutional: He is oriented to person, place, and time. He appears well-developed and well-nourished.   HENT:   Head: Normocephalic.   Eyes: Conjunctivae are normal.   Neck: Normal range of motion.   Cardiovascular: Normal rate, regular rhythm and normal heart sounds.    No murmur heard.  Pulmonary/Chest: Breath sounds normal. No respiratory distress. He has no wheezes. He has no rales.   Abdominal: Soft. Bowel sounds are normal. He exhibits no distension. There is no tenderness.   Musculoskeletal: He exhibits no edema.   Decreased ROM right upper extremity due to a bad shoulder and arthritis   Neurological: He is alert and oriented to person, place, and time.   Weaker grasp on the right - arthritis.    Skin: Skin is warm.   Healed right chest incision from removal of port a cath  Healed neck incision from tracheostomy  Bruising abdomen   Psychiatric: He has a normal mood and affect. His behavior is normal.         LABS:   HGB 9.1  10/9/17-hgb 8.8  10/9/17  Albumin-2.7 down from 2.9  GFR-36-on 9/20/17 it was 12    Current Outpatient Prescriptions   Medication Sig     acetaminophen (TYLENOL) 650 mg/20.3 mL Soln 650 mg by G-tube route. solution; 650 mg/20.3 mL; amt: 650 mg/20.3 mL; gastric tube  Special Instructions: Mild pain (pain scale 1-3)/ fever  Every 4 Hours - PRN; PRN 1, PRN 2, PRN 3, PRN 4, PRN 5, PRN 6     allopurinol (ZYLOPRIM) 300 MG tablet Take 450 mg by mouth daily.     bumetanide (BUMEX) 1 MG tablet Take 1 mg by mouth daily.     calcium acetate (PHOSLYRA) 667 mg (169 mg calcium)/5 mL Soln solution 1,334 mg by G-tube route 3 (three) times a day with meals. Special Instructions: Take with meals.  Three Times A Day; 08:00 AM, 12:00 PM, 05:00 PM      carboxymethylcellulose sodium (REFRESH CELLUVISC) 1 % DpGe Administer to both eyes. Special  Instructions: Administer 2 drops to each eye PRN.  Every Hour - PRN; PRN 1, PRN 2, PRN 3, PRN 4, PRN 5, PRN 6, PRN 7, PRN  8, PRN 9, PRN 10, PRN 11, PRN 12, PRN 13, PRN 14, PRN 15, PRN 16,  PRN 17, PRN 18, PRN 19, PRN 20, PRN 21, PRN 22, PRN 23, PRN 24     cholecalciferol, vitamin D3, 1,000 unit tablet Take 1,000 Units by mouth daily.     docusate sodium (COLACE) 50 mg/5 mL oral liquid 100 mg by G-tube route 2 (two) times a day. docusate sodium [OTC]  liquid; 50 mg/5 mL; amt: 100 mg/10 mL; gastric tube  Twice A Day; 08:00 AM, 08:00 PM     epoetin delgado (EPOGEN,PROCRIT) 20,000 unit/mL injection Inject 7,500 Units under the skin once a week in the evening. Hold if HGB greater than 9. Check HGB once a week  For 4 weeks on Monday-     Medication once a day on Monday and Thursday     magnesium oxide (MAG-OX) 400 mg tablet 400 mg by G-tube route 3 (three) times a day.     melatonin 3 mg Tab tablet Take 6 mg by mouth at bedtime as needed.      metoprolol tartrate (LOPRESSOR) 50 MG tablet 50 mg by G-tube route 2 (two) times a day. Special Instructions: Hold for SBP <80  Twice A Day; 08:00 AM, 08:00 PM     multivitamin with minerals (THERA-M) 9 mg iron-400 mcg Tab tablet Take 1 tablet by mouth daily.     omeprazole (PRILOSEC OTC) 20 MG tablet Take 20 mg by mouth daily before breakfast.     ondansetron (ZOFRAN) 4 MG tablet Take 4 mg by mouth every 8 (eight) hours as needed for nausea.      oxyCODONE (ROXICODONE) 5 MG immediate release tablet 10 mg by G-tube route every 4 (four) hours as needed for pain. tablet; 5 mg; amt: 10 mg; gastric tube  Special Instructions: For moderate to severe pain (pain scale 4-10)  Every 4 Hours - PRN; PRN 1, PRN 2, PRN 3, PRN 4, PRN 5, PRN 6     polyethylene glycol (MIRALAX) 17 gram packet Take 17 g by mouth daily. Special Instructions: First choice for everyday PRN constipation.  Once A Day - PRN; PRN 1     polyvinyl alcohol (LIQUIFILM TEARS) 1.4 % ophthalmic solution Administer 1 drop to both  eyes 2 (two) times a day as needed for dry eyes.     potassium chloride (KLOR-CON) 20 mEq packet Take 40 mEq by mouth daily.     predniSONE (DELTASONE) 10 mg tablet Take 10 mg by mouth daily.     [START ON 10/20/2017] predniSONE (DELTASONE) 5 MG tablet Take 5 mg by mouth daily.     senna (SENOKOT) 8.6 mg tablet 2 tablets by G-tube route 2 (two) times a day.     simvastatin (ZOCOR) 20 MG tablet 20 mg by G-tube route at bedtime.     hypromellose (NATURES TEARS) Drop 1 drop 2 (two) times a day as needed.     ASSESSMENT:      ICD-10-CM    1. Hyperglycemia, drug-induced R73.9     T50.905A    2. Anemia of chronic renal failure, unspecified stage N18.9     D63.1    3. Aspiration pneumonia, unspecified aspiration pneumonia type, unspecified laterality, unspecified part of lung J69.0    4. Acute on chronic diastolic congestive heart failure I50.33    5. Stage 3 chronic kidney disease N18.3    6. Acute on chronic respiratory failure with hypoxia J96.21        PLAN:    Continue to optimize therapy  Anemia weekly HGB,  on Epogen for HGB < 9 twice weekly  Cognition appeared intact- Pt to have cognitive testing in facility.   HTN stable on metoprolol BP's stable but pt tachycardia  Hyperglycemia due to prednisone. BS have been stable- Katherine avina'd and will continue to monitor have been stable on prednisone.    Electronically signed by: Alice Ashton CNP

## 2021-06-13 NOTE — PROGRESS NOTES
[facility]    Facility:   [facility] [[id number]]   Code Status: DNR      CHIEF COMPLAINT/REASON FOR VISIT:  Chief Complaint   Patient presents with     Review Of Multiple Medical Conditions     tf, acute kidney failure,        HISTORY:      HPI: Shai Gibbs is a 67 y.o. male with history of septicemia related to urinary tract infection which led to adult respiratory distress syndrome cardiac arrest hyperkalemia and approximately a two month hospitalization..  He has recovered sufficiently from the acute issues to be admitted to the transitional care unit back in [place] as opposed to [place] where he was on vacation. He is a retired  and  who worked for [employer]. He is planning on early discharge hopefully and planning on being at a family wedding mid-November. He has been through renal failure with constant then intermittent and now discontinued dialysis. He had respiratory failure regarding tracheostomy which is now been removed and incision is healed.  He has a feeding gastrostomy left in place but is almost entirely on oral intake and getting supplements at night. There is inconsistency in his intake from 25%-100%. He is currently on a 3 day calorie count.  Today he is seen in his room. He denies CP or SOB. He is making progress in therapy. His fingersticks have been reviewed and have been stable on the prednisone taper. Katherine was dc'd and I will monitor FS x 3days.   Past Medical History:   Diagnosis Date     AAA (abdominal aortic aneurysm)      JEOVANNY (acute kidney injury)      Alcohol abuse      Anemia      Anemia of chronic renal failure, unspecified stage      ARDS (adult respiratory distress syndrome)      Arthritis, rheumatoid      Aspiration pneumonia      Cardiac arrest      Chronic kidney disease      Essential hypertension      Gout      Hyperglycemia, drug-induced     steroids     Hyperkalemia      Hyperlipemia      Hypotension       Idiopathic gout      Morbid obesity      Red blood cell antibody positive      Respiratory failure      Shock      Stage 3 chronic kidney disease      Ventilator associated pneumonia              Family History   Problem Relation Age of Onset     Family history unknown: Yes     Social History     Social History     Marital status:      Spouse name: N/A     Number of children: N/A     Years of education: N/A     Occupational History     [employer]      Retired,  and manager     Social History Main Topics     Smoking status: Never Smoker     Smokeless tobacco: Never Used     Alcohol use No     Drug use: Not on file     Sexual activity: Not on file     Other Topics Concern     Not on file     Social History Narrative     with 3 adult children.  Adult son lives with father and mother in split level home with 12 steps.         Review of Systems   Constitutional: Positive for activity change and fatigue. Negative for appetite change, chills and fever.        Inconsistent with oral intake- placed on a 3 day calorie count   HENT: Negative for congestion and sore throat.    Eyes: Negative for visual disturbance.   Respiratory: Negative for cough, shortness of breath and wheezing.    Cardiovascular: Negative for chest pain and leg swelling.   Gastrointestinal: Negative for abdominal distention, abdominal pain, constipation, diarrhea and nausea.   Genitourinary: Negative for dysuria.   Musculoskeletal: Positive for arthralgias and joint swelling. Negative for myalgias.   Skin: Negative for color change, rash and wound.   Neurological: Negative for dizziness, weakness and numbness.   Psychiatric/Behavioral: Negative for agitation, behavioral problems and sleep disturbance.       .  Vitals:    10/10/17 1811   BP: 134/84   Pulse: (!) 104   Resp: 19   Temp: 97.3  F (36.3  C)   SpO2: 97%   Weight: (!) 223 lb 9.6 oz (101.4 kg)       Physical Exam   Constitutional: He is oriented to person, place, and  time. He appears well-developed and well-nourished.   HENT:   Head: Normocephalic.   Eyes: Conjunctivae are normal.   Neck: Normal range of motion.   Cardiovascular: Normal rate, regular rhythm and normal heart sounds.    No murmur heard.  Pulmonary/Chest: Breath sounds normal. No respiratory distress. He has no wheezes. He has no rales.   Abdominal: Soft. Bowel sounds are normal. He exhibits no distension. There is no tenderness.   Musculoskeletal: He exhibits no edema.   Decreased ROM right upper extremity due to a bad shoulder and arthritis   Neurological: He is alert and oriented to person, place, and time.   Weaker grasp on the right - arthritis.    Skin: Skin is warm.   Healed right chest incision from removal of port a cath  Healed neck incision from tracheostomy  Bruising abdomen   Psychiatric: He has a normal mood and affect. His behavior is normal.         LABS:   10/9/17-hgb 8.8  10/9/17  Albumin-2.9  GFR-36-on 9/20/17 it was 12    ASSESSMENT:      ICD-10-CM    1. Acute on chronic respiratory failure with hypoxia J96.21    2. Ventilator associated pneumonia J95.851    3. Anemia of chronic renal failure, unspecified stage N18.9     D63.1    4. Essential hypertension I10    5. Hyperglycemia, drug-induced R73.9     T50.905A    6. Hyperkalemia E87.5        PLAN:    Continue to optimize therapy  Anemia weekly HGB,  on Epogen for HGB < 9 twice weekly  Cognition appeared intact- Pt to have cognitive testing in facility.   HTN stable on metoprolol  Hyperglycemia due to prednisone. BS have been stable- Katherine yates and will continue to monitor BS    Electronically signed by: Alice Ashton CNP

## 2021-06-13 NOTE — PROGRESS NOTES
[facility]    DATE: 10/12/2017  NAME: Shai Gibbs  : 1950           MR# [id number]     CODE STATUS:  DNR    VISIT TYPE: Problem  FACILITY: [facility] [[id number]]        ROOM: 307  PRIMARY CARE PROVIDER: No Primary Care Provider Phone: None Fax:[phone]     History Course:  Shai Gibbs is a 67 y.o. male with A history of septicemia related to a urinary tract infection which resulted in Cardiac arrest arrest from Hyperkalemia and a two-month prolonged hospitalization after near-death.  He is gaining strength steadily at this point and is trying hard for discharge by . His voice is stronger his oral intake is sufficient to discontinue the gastrostomy feeding although is only had the gastrostomy tube for four weeks. His strength is steadily improving. He is quite clear about his plans  And necessity for modifying his  Negative behaviors that may have contributed to his prolonged hospitalization    Past Medical History:  Past Medical History:   Diagnosis Date     AAA (abdominal aortic aneurysm)      JEOVANNY (acute kidney injury)      Alcohol abuse      Anemia      Anemia of chronic renal failure, unspecified stage      ARDS (adult respiratory distress syndrome)      Arthritis, rheumatoid      Aspiration pneumonia      Cardiac arrest      Chronic kidney disease      Essential hypertension      Gout      Hyperglycemia, drug-induced     steroids     Hyperkalemia      Hyperlipemia      Hypotension      Idiopathic gout      Morbid obesity      Red blood cell antibody positive      Respiratory failure      Shock      Stage 3 chronic kidney disease      Ventilator associated pneumonia        Allergies:  No Known Allergies    Discharge Medications:  Current Outpatient Prescriptions   Medication Sig     acetaminophen (TYLENOL) 650 mg/20.3 mL Soln 650 mg by G-tube route. solution; 650 mg/20.3 mL; amt: 650 mg/20.3 mL; gastric tube  Special Instructions: Mild pain  (pain scale 1-3)/ fever  Every 4 Hours - PRN; PRN 1, PRN 2, PRN 3, PRN 4, PRN 5, PRN 6     allopurinol (ZYLOPRIM) 300 MG tablet Take 450 mg by mouth daily.     bumetanide (BUMEX) 1 MG tablet Take 1 mg by mouth daily.     calcium acetate (PHOSLYRA) 667 mg (169 mg calcium)/5 mL Soln solution 1,334 mg by G-tube route 3 (three) times a day with meals. Special Instructions: Take with meals.  Three Times A Day; 08:00 AM, 12:00 PM, 05:00 PM      carboxymethylcellulose sodium (REFRESH CELLUVISC) 1 % DpGe Administer to both eyes. Special Instructions: Administer 2 drops to each eye PRN.  Every Hour - PRN; PRN 1, PRN 2, PRN 3, PRN 4, PRN 5, PRN 6, PRN 7, PRN  8, PRN 9, PRN 10, PRN 11, PRN 12, PRN 13, PRN 14, PRN 15, PRN 16,  PRN 17, PRN 18, PRN 19, PRN 20, PRN 21, PRN 22, PRN 23, PRN 24     cholecalciferol, vitamin D3, 1,000 unit tablet Take 1,000 Units by mouth daily.     colchicine 0.6 mg tablet Take 0.6 mg by mouth daily. Once a day on Monday     docusate sodium (COLACE) 50 mg/5 mL oral liquid 100 mg by G-tube route 2 (two) times a day. docusate sodium [OTC]  liquid; 50 mg/5 mL; amt: 100 mg/10 mL; gastric tube  Twice A Day; 08:00 AM, 08:00 PM     epoetin delgado (EPOGEN,PROCRIT) 20,000 unit/mL injection Inject 7,500 Units under the skin once a week in the evening. Hold if HGB greater than 9. Check HGB once a week  For 4 weeks on Monday-     Medication once a day on Monday and Thursday     hypromellose (NATURES TEARS) Drop 1 drop 2 (two) times a day as needed.     magnesium oxide (MAG-OX) 400 mg tablet 400 mg by G-tube route 3 (three) times a day.     melatonin 3 mg Tab tablet Take 6 mg by mouth at bedtime as needed.      metoprolol tartrate (LOPRESSOR) 50 MG tablet 50 mg by G-tube route 2 (two) times a day. Special Instructions: Hold for SBP <80  Twice A Day; 08:00 AM, 08:00 PM     multivitamin with minerals (THERA-M) 9 mg iron-400 mcg Tab tablet Take 1 tablet by mouth daily.     omeprazole (PRILOSEC OTC) 20 MG tablet Take 20  mg by mouth daily before breakfast.     ondansetron (ZOFRAN) 4 MG tablet Take 4 mg by mouth every 8 (eight) hours as needed for nausea.      oxyCODONE (ROXICODONE) 5 MG immediate release tablet 10 mg by G-tube route every 4 (four) hours as needed for pain. tablet; 5 mg; amt: 10 mg; gastric tube  Special Instructions: For moderate to severe pain (pain scale 4-10)  Every 4 Hours - PRN; PRN 1, PRN 2, PRN 3, PRN 4, PRN 5, PRN 6     polyethylene glycol (MIRALAX) 17 gram packet Take 17 g by mouth daily. Special Instructions: First choice for everyday PRN constipation.  Once A Day - PRN; PRN 1     potassium chloride (KLOR-CON) 20 mEq packet Take 40 mEq by mouth daily.     [START ON 10/13/2017] predniSONE (DELTASONE) 10 mg tablet Take 10 mg by mouth daily.     predniSONE (DELTASONE) 20 MG tablet Take 20 mg by mouth daily.     [START ON 10/20/2017] predniSONE (DELTASONE) 5 MG tablet Take 5 mg by mouth daily.     senna (SENOKOT) 8.6 mg tablet 2 tablets by G-tube route 2 (two) times a day.     simvastatin (ZOCOR) 20 MG tablet 20 mg by G-tube route at bedtime.       Review of Systems:  History obtained from chart review and the patient  Respiratory ROS: no cough, shortness of breath, or wheezing  Cardiovascular ROS: no chest pain or dyspnea on exertion  Gastrointestinal ROS: no abdominal pain, change in bowel habits, or black or bloody stools  Genito-Urinary ROS: no dysuria, trouble voiding, or hematuria  Musculoskeletal ROS: Decreased stamina but symmetrical strength and tone  Neurological ROS: no TIA or stroke symptoms  Dermatological ROS: no open skin lesions are acknowledged       Physical Examination:  /70  Pulse (!) 107  Temp 98.6  F (37  C)  Resp 18  Wt (!) 227 lb 9.6 oz (103.2 kg)  SpO2 99%  General appearance: alert, appears stated age, cooperative, fatigued, flushed, no distress and moderately obese  Neck: no adenopathy, no carotid bruit, no JVD, supple, symmetrical, trachea midline and thyroid not  enlarged, symmetric, no tenderness/mass/nodules  Lungs: clear to auscultation bilaterally  Heart: regular rate and rhythm, S1, S2 normal, no murmur, click, rub or gallop  Abdomen: soft, non-tender; bowel sounds normal; no masses,  no organomegaly and obese With feeding gastrostomy still in place  Extremities: extremities normal, atraumatic, no cyanosis or edema  Pulses: 2+ and symmetric  Skin: clean skin around gastrostomy opening  Neurologic: Mental status: Alert, oriented, thought content appropriate, but somewhat emotional  Motor:globally diminished but steadily increasing strength       Laboratory:None            Diagnosis:  Shai Gibbs is a 67 y.o. male with A recent bout of cardiac arrest related to infection and renal failure    1. Cardiac arrest     2. Stage 3 chronic kidney disease     3. Acute on chronic respiratory failure with hypoxia     4. Hyperkalemia     5. Idiopathic gout     6. Acute on chronic diastolic congestive heart failure     7. Ventilator associated pneumonia     8. Abdominal aortic aneurysm (AAA) without rupture     9. Aspiration pneumonia, unspecified aspiration pneumonia type, unspecified laterality, unspecified part of lung     10. Alcohol abuse     11. Anemia of chronic renal failure, unspecified stage     12. Essential hypertension     13. Rheumatoid arthritis involving both hands, unspecified rheumatoid factor presence     14. Hyperglycemia, drug-induced           Plan: Continue aggressive rehabilitation. I believe his desired  Discharge date of early November is feasible    Electronically signed by: Carter Andersen Sr., MD

## 2021-06-13 NOTE — PROGRESS NOTES
[facility]    DATE: 10/31/2017  NAME: Shai Gibbs  : 1950           MR# [id number]     CODE STATUS:  DNR    VISIT TYPE: Discharge   FACILITY: [facility] [[id number]]        ROOM: 307  PRIMARY CARE PROVIDER: No Primary Care Provider Phone: None Fax:[phone]     History Course:  Shai Gibbs is a 67 y.o. male with A history of  Cardiac arrest and prolonged bed rest associated with transient renal failure necessitating dialysis. After a three month recovery. He is now regained enough strength to care for himself at home although he still has his gastrostomy feeding tube which will be removed now nine weeks post placement. He is off dialysis but needs to be followed by nephrology with regard to his image and use his calcium acetate use  And His Bumetanide use. He reports he has an appointment with his primary will help him arrange his follow-up visits. Our nursing service is working on formal arrangements for removal of the gastrostomy and nephrology follow-up. Shai bonilla always is upbeat and is relieved to be able to go home tomorrow although is cognizant of how far he's come  From near-death in [place].  Past Medical History:  Past Medical History:   Diagnosis Date     AAA (abdominal aortic aneurysm)      JEOVANNY (acute kidney injury)      Alcohol abuse      Anemia      Anemia of chronic renal failure, unspecified stage      ARDS (adult respiratory distress syndrome)      Arthritis, rheumatoid      Aspiration pneumonia      Cardiac arrest      Chronic kidney disease      Essential hypertension      Gout      Hyperglycemia, drug-induced     steroids     Hyperkalemia      Hyperlipemia      Hypotension      Idiopathic gout      Morbid obesity      Red blood cell antibody positive      Respiratory failure      Shock      Stage 3 chronic kidney disease      Ventilator associated pneumonia        Allergies:  No Known Allergies    Discharge Medications:  Current  Outpatient Prescriptions   Medication Sig     allopurinol (ZYLOPRIM) 300 MG tablet Take 450 mg by mouth daily.     bumetanide (BUMEX) 1 MG tablet Take 1 mg by mouth daily.     calcium acetate (PHOSLYRA) 667 mg (169 mg calcium)/5 mL Soln solution Take 1,334 mg by mouth 3 (three) times a day with meals. Special Instructions: Take with meals.  Three Times A Day; 08:00 AM, 12:00 PM, 05:00 PM      cholecalciferol, vitamin D3, 1,000 unit tablet Take 1,000 Units by mouth daily.     cyanocobalamin 1000 MCG tablet Take 1,000 mcg by mouth daily.     magnesium oxide (MAG-OX) 400 mg tablet Take 400 mg by mouth 3 (three) times a day.      metoprolol tartrate (LOPRESSOR) 50 MG tablet Take 50 mg by mouth 2 (two) times a day. Special Instructions: Hold for SBP <80  Twice A Day; 08:00 AM, 08:00 PM      multivitamin with minerals (THERA-M) 9 mg iron-400 mcg Tab tablet Take 1 tablet by mouth daily.     polyethylene glycol (MIRALAX) 17 gram packet Take 17 g by mouth daily. Special Instructions: First choice for everyday PRN constipation.  Once A Day - PRN; PRN 1     potassium chloride (KLOR-CON) 20 mEq packet Take 40 mEq by mouth daily.     simvastatin (ZOCOR) 20 MG tablet Take 20 mg by mouth at bedtime.        Review of Systems:  History obtained from chart review and the patient  Respiratory ROS: no cough, shortness of breath, or wheezing  Cardiovascular ROS: no chest pain or dyspnea on exertion  Gastrointestinal ROS: no abdominal pain, change in bowel habits, or black or bloody stools  Genito-Urinary ROS: no dysuria, trouble voiding, or hematuria  Musculoskeletal ROS: Generally weak especially lower extremities but steady improvement although he has a residual tachycardia suggesting weakness  Neurological ROS: no TIA or stroke symptoms  Dermatological ROS: denies active skin lesions       Physical Examination:  /78  Pulse (!) 113  Temp 98.5  F (36.9  C)  Resp 20  Wt 220 lb 6.4 oz (100 kg)  SpO2 97%  General appearance:  alert, appears stated age, cooperative, flushed, no distress and mildly obese  Neck: no adenopathy, no carotid bruit, no JVD, supple, symmetrical, trachea midline and thyroid not enlarged, symmetric, no tenderness/mass/nodules  Lungs: clear to auscultation bilaterally  Heart: regular rate and rhythm, S1, S2 normal, no murmur, click, rub or gallop  Abdomen: obese normal bowel sounds  Extremities: extremities normal, atraumatic, no cyanosis or edema Still residual lower extremity  Lack of endurance although briefly he is able to move  Pulses: 2+ and symmetric  Skin: Skin color, texture, turgor normal. No rashes or lesions  Neurologic: Mental status: Alert, oriented, thought content appropriate  Motor:diminish drinks especially lower extremities       Laboratory:None          Discharge Diagnosis:  Shai Gibbs is a 67 y.o. male with History of cardiac arrest and renal failure requiring dialysis now after prolonged recovery.  Re-approaching normal function outside of care facility    1. Cardiac arrest     2. Ventilator associated pneumonia     3. Aspiration pneumonia, unspecified aspiration pneumonia type, unspecified laterality, unspecified part of lung     4. Acute on chronic diastolic congestive heart failure     5. Idiopathic gout     6. Anemia of chronic renal failure, unspecified stage     7. Alcohol abuse     8. Rheumatoid arthritis involving both hands, unspecified rheumatoid factor presence     9. Stage 3 chronic kidney disease     10. Essential hypertension     11. Abdominal aortic aneurysm (AAA) without rupture         Discharge Plan: Encourage him to continue physical therapy follow up with gastroenterology for his feeding tube in nephrology for his kidney function as well as his primary doctor.. He informs me he has a follow-up appointment next week with his primary    Electronically signed by: Carter Andersen Sr., MD

## 2021-06-13 NOTE — PROGRESS NOTES
[facility]    DATE: 10/5/2017  NAME: Shai Gibbs  : 1950           MR# [id number]     CODE STATUS:  DNR  VISIT TYPE: Admission  FACILITY: [facility] [[id number]]    ROOM: 307  PRIMARY CARE PROVIDER: No Primary Care Provider Phone: None Fax:[phone]    History of Present Illness:   Shai Gibbs is a 67 y.o. male with History of septicemia related to urinary tract infection which led to adult respiratory distress syndrome cardiac arrest hyperkalemia and approximately a two month hospitalization..  He has recovered sufficiently from the acute issues to be admitted to the transitional care unit back in [place] as opposed to [place] where he was on vacation. He is a retired  and  who worked for the [employer]. He is planning on early discharge hopefully and planning on being at A family wedding mid-November. He has been through renal failure with constant then intermittent and now discontinued dialysis. He had respiratory failure regarding tracheostomy which is now been removed. He has a feeding gastrostomy left in place but is almost entirely on oral intake. Family reports he's had his feeding gastrostomy for about six weeks. As a medium today he is committed to improvement with hopes of discharge may be even within the next few weeks    Past Medical History:  Past Medical History:   Diagnosis Date     AAA (abdominal aortic aneurysm)      Alcohol abuse      Anemia of chronic renal failure, unspecified stage      Arthritis, rheumatoid      Aspiration pneumonia      Cardiac arrest      Chronic kidney disease      Essential hypertension      Hyperglycemia, drug-induced     steroids     Hyperkalemia      Hyperlipemia      Idiopathic gout      Morbid obesity      Respiratory failure      Ventilator associated pneumonia        Past Surgical History:  Past Surgical History:   Procedure Laterality Date     CARPAL TUNNEL RELEASE Bilateral      PEG  TUBE PLACEMENT       SUBTOTAL COLECTOMY      Diverticulitis     TRACHEOSTOMY         Allergies:  No Known Allergies    Social History:  Social History     Social History     Marital status:      Spouse name: N/A     Number of children: N/A     Years of education: N/A     Occupational History     [employer]      Retired,  and manager     Social History Main Topics     Smoking status: Not on file     Smokeless tobacco: Not on file     Alcohol use Not on file     Drug use: Not on file     Sexual activity: Not on file     Other Topics Concern     Not on file     Social History Narrative     with 3 adult children.  Adult son lives with father and mother in split level home with 12 steps.       Family History:  No family history on file.    Current Medications:  Current Outpatient Prescriptions   Medication Sig     acetaminophen (TYLENOL) 650 mg/20.3 mL Soln 650 mg by G-tube route. solution; 650 mg/20.3 mL; amt: 650 mg/20.3 mL; gastric tube  Special Instructions: Mild pain (pain scale 1-3)/ fever  Every 4 Hours - PRN; PRN 1, PRN 2, PRN 3, PRN 4, PRN 5, PRN 6     calcium acetate (PHOSLYRA) 667 mg (169 mg calcium)/5 mL Soln solution 1,334 mg by G-tube route 3 (three) times a day with meals. Special Instructions: Take with meals.  Three Times A Day; 08:00 AM, 12:00 PM, 05:00 PM      carboxymethylcellulose sodium (REFRESH CELLUVISC) 1 % DpGe Administer to both eyes. Special Instructions: Administer 2 drops to each eye PRN.  Every Hour - PRN; PRN 1, PRN 2, PRN 3, PRN 4, PRN 5, PRN 6, PRN 7, PRN  8, PRN 9, PRN 10, PRN 11, PRN 12, PRN 13, PRN 14, PRN 15, PRN 16,  PRN 17, PRN 18, PRN 19, PRN 20, PRN 21, PRN 22, PRN 23, PRN 24     docusate sodium (COLACE) 50 mg/5 mL oral liquid 100 mg by G-tube route 2 (two) times a day. docusate sodium [OTC]  liquid; 50 mg/5 mL; amt: 100 mg/10 mL; gastric tube  Twice A Day; 08:00 AM, 08:00 PM     hypromellose (NATURES TEARS) Drop 1 drop 2 (two) times a day as needed.      magnesium oxide (MAG-OX) 400 mg tablet 400 mg by G-tube route 3 (three) times a day.     melatonin 3 mg Tab tablet 6 mg by G-tube route at bedtime as needed.     metoprolol tartrate (LOPRESSOR) 50 MG tablet 50 mg by G-tube route 2 (two) times a day. Special Instructions: Hold for SBP <80  Twice A Day; 08:00 AM, 08:00 PM     ondansetron (ZOFRAN) 4 MG tablet 4 mg by G-tube route every 8 (eight) hours as needed for nausea.      oxyCODONE (ROXICODONE) 5 MG immediate release tablet 10 mg by G-tube route every 4 (four) hours as needed for pain. tablet; 5 mg; amt: 10 mg; gastric tube  Special Instructions: For moderate to severe pain (pain scale 4-10)  Every 4 Hours - PRN; PRN 1, PRN 2, PRN 3, PRN 4, PRN 5, PRN 6     polyethylene glycol (MIRALAX) 17 gram packet Take 17 g by mouth daily. Special Instructions: First choice for everyday PRN constipation.  Once A Day - PRN; PRN 1     QUEtiapine (SEROQUEL) 25 MG tablet 12.5 mg by G-tube route at bedtime.     senna (SENOKOT) 8.6 mg tablet 2 tablets by G-tube route 2 (two) times a day.     sennosides (SENNOSIDES) 8.8 mg/5 mL Syrp syrup Take 8.8 mg by mouth.     simvastatin (ZOCOR) 20 MG tablet 20 mg by G-tube route at bedtime.       Review of Systems:  History obtained from chart review and the patient  General ROS: positive for  - fatigue  negative for - chills or fever  Psychological ROS: negative for - concentration difficulties, disorientation, hallucinations or memory difficulties  Ophthalmic ROS: negative for - decreased vision or loss of vision  ENT ROS: negative for - nasal discharge or sore throat  Respiratory ROS: no cough, shortness of breath, or wheezing  Cardiovascular ROS: no chest pain or dyspnea on exertion  Gastrointestinal ROS: no abdominal pain, change in bowel habits, or black or bloody stools  Genito-Urinary ROS: no dysuria, trouble voiding, or hematuria  Musculoskeletal ROS: Diffuse deconditioning and weakness after a eight week stay in hospital no  localization  Neurological ROS: no TIA or stroke symptoms  Dermatological ROS: he feels the skin is healing well from the multiple punctures and bruises that he is sustained during his recent hospitalization       Physical Examination:  There were no vitals taken for this visit.  General appearance: alert, appears stated age, cooperative, distracted, fatigued, no distress and moderately obese  Head: Normocephalic, without obvious abnormality, atraumatic  Eyes: conjunctivae/corneas clear. PERRL, EOM's intact. Fundi benign.  Nose: Nares normal. Septum midline. Mucosa normal. No drainage or sinus tenderness.  Throat: lips, mucosa, and tongue normal; teeth and gums normal  Neck: no adenopathy, no carotid bruit, no JVD, supple, symmetrical, trachea midline and thyroid not enlarged, symmetric, no tenderness/mass/nodules  Back: symmetric, no curvature. ROM normal. No CVA tenderness.  Lungs: clear to auscultation bilaterally  Chest wall: no tenderness  Heart: regular rate and rhythm, S1, S2 normal, no murmur, click, rub or gallop  Abdomen: soft, non-tender; bowel sounds normal; no masses,  no organomegaly and obese  Extremities: globally diminish strength but moves all extremities within tech sensation  Pulses: 2+ and symmetric  Skin: Skin color, texture, turgor normal. No rashes or lesions  Neurologic: Mental status: Alert, oriented, thought content appropriate  Motor:globally diminish strength with no localized weakness       Impression:  Shai Gibbs is a 67 y.o. male with A history of infection leading to  Adult respiratory distress syndrome and eventual  Renal and respiratory failure as well as cardiac arrest. He is recovered over a two-month period is off his feeding  Gastrostomy almost exclusively and is free of the tracheostomy and now is working to regain  Muscular strength    1. Cardiac arrest     2. Stage 3 chronic kidney disease     3. Acute on chronic respiratory failure with hypoxia     4. Hyperkalemia      5. Idiopathic gout     6. Acute on chronic diastolic congestive heart failure     7. Ventilator associated pneumonia     8. Abdominal aortic aneurysm (AAA) without rupture     9. Aspiration pneumonia, unspecified aspiration pneumonia type, unspecified laterality, unspecified part of lung     10. Alcohol abuse     11. Anemia of chronic renal failure, unspecified stage     12. Essential hypertension     13. Rheumatoid arthritis involving both hands, unspecified rheumatoid factor presence     14. Hyperglycemia, drug-induced           Plan: At this point  Encouragement to regain strength  Would provide the best solution for his recovery. He seems cognitively intact and cooperative at this point but given the harrowing nature of his recovery I would not Be surprised with some cognitive losses. Will await cognitive testing and functional testing At our facility    Electronically signed by: Carter Andersen Sr., MD

## 2021-06-13 NOTE — PROGRESS NOTES
[facility]    Facility:   [facility] [[id number]]   Code Status: DNR      CHIEF COMPLAINT/REASON FOR VISIT:  Chief Complaint   Patient presents with     Review Of Multiple Medical Conditions       HISTORY:      HPI: Shai Gibbs is a 67 y.o. male with history of septicemia related to urinary tract infection which led to adult respiratory distress syndrome cardiac arrest hyperkalemia and approximately a two month hospitalization..  He has recovered sufficiently from the acute issues to be admitted to the transitional care unit back in [place] as opposed to [place] where he was on vacation. He is a retired  and  who worked for the [employer]. He is planning on early discharge hopefully and planning on being at a family wedding mid-November. He has been through renal failure with constant then intermittent and now discontinued dialysis. He had respiratory failure regarding tracheostomy which is now been removed and incision is healed.  He has a feeding gastrostomy left in place but is now taking medications and feedings orally. .   Today he is seen in his room. He denies CP or SOB. He is making progress in therapy. He is scheduled for discharge on November 1st  And will continue outpatient therapy 3x a week at the facility.  He has advanced to a green tag and ambulating independently with a walker.  He tells me he is is scheduled for a care conference today.  Past Medical History:   Diagnosis Date     AAA (abdominal aortic aneurysm)      JEOVANNY (acute kidney injury)      Alcohol abuse      Anemia      Anemia of chronic renal failure, unspecified stage      ARDS (adult respiratory distress syndrome)      Arthritis, rheumatoid      Aspiration pneumonia      Cardiac arrest      Chronic kidney disease      Essential hypertension      Gout      Hyperglycemia, drug-induced     steroids     Hyperkalemia      Hyperlipemia      Hypotension      Idiopathic gout       Morbid obesity      Red blood cell antibody positive      Respiratory failure      Shock      Stage 3 chronic kidney disease      Ventilator associated pneumonia              Family History   Problem Relation Age of Onset     Family history unknown: Yes     Social History     Social History     Marital status:      Spouse name: N/A     Number of children: N/A     Years of education: N/A     Occupational History     [employer]      Retired,  and manager     Social History Main Topics     Smoking status: Never Smoker     Smokeless tobacco: Never Used     Alcohol use No     Drug use: Not on file     Sexual activity: Not on file     Other Topics Concern     Not on file     Social History Narrative     with 3 adult children.  Adult son lives with father and mother in split level home with 12 steps.         Review of Systems   Constitutional: Positive for fatigue. Negative for activity change, appetite change, chills and fever.   HENT: Negative for congestion and sore throat.    Eyes: Negative for visual disturbance.   Respiratory: Negative for cough, shortness of breath and wheezing.    Cardiovascular: Negative for chest pain and leg swelling.   Gastrointestinal: Negative for abdominal distention, abdominal pain, constipation, diarrhea and nausea.   Genitourinary: Negative for dysuria.   Musculoskeletal: Positive for arthralgias and joint swelling. Negative for myalgias.   Skin: Negative for color change, rash and wound.   Neurological: Negative for dizziness, weakness and numbness.   Psychiatric/Behavioral: Negative for agitation, behavioral problems and sleep disturbance.       .  Vitals:    10/27/17 0809   BP: 120/68   Pulse: 82   Resp: 19   Temp: 98.1  F (36.7  C)   SpO2: 99%   Weight: (!) 223 lb (101.2 kg)       Physical Exam   Constitutional: He is oriented to person, place, and time. He appears well-developed and well-nourished.   HENT:   Head: Normocephalic.   Eyes: Conjunctivae are  normal.   Neck: Normal range of motion.   Cardiovascular: Normal rate, regular rhythm and normal heart sounds.    No murmur heard.  Pulmonary/Chest: Breath sounds normal. No respiratory distress. He has no wheezes. He has no rales.   Abdominal: Soft. Bowel sounds are normal. He exhibits no distension. There is no tenderness.   Musculoskeletal: He exhibits no edema.   Decreased ROM right upper extremity due to a bad shoulder and arthritis   Neurological: He is alert and oriented to person, place, and time.   Weaker grasp on the right - arthritis.    Skin: Skin is warm.   Healed right chest incision from removal of port a cath  Healed neck incision from tracheostomy  Bruising abdomen   Psychiatric: He has a normal mood and affect. His behavior is normal.         LABS:   HGB 9.1 on 10/23/17  10/9/17-hgb 8.8  10/9/17  Albumin-2.7 down from 2.9  GFR-36-on 9/20/17 it was 12    Current Outpatient Prescriptions   Medication Sig     acetaminophen (TYLENOL) 650 mg/20.3 mL Soln Take 650 mg by mouth every 4 (four) hours as needed.      allopurinol (ZYLOPRIM) 300 MG tablet Take 450 mg by mouth daily.     bumetanide (BUMEX) 1 MG tablet Take 1 mg by mouth daily.     calcium acetate (PHOSLYRA) 667 mg (169 mg calcium)/5 mL Soln solution Take 1,334 mg by mouth 3 (three) times a day with meals. Special Instructions: Take with meals.  Three Times A Day; 08:00 AM, 12:00 PM, 05:00 PM      carboxymethylcellulose sodium (REFRESH CELLUVISC) 1 % DpGe Administer to both eyes. Special Instructions: Administer 2 drops to each eye PRN.  Every Hour - PRN; PRN 1, PRN 2, PRN 3, PRN 4, PRN 5, PRN 6, PRN 7, PRN  8, PRN 9, PRN 10, PRN 11, PRN 12, PRN 13, PRN 14, PRN 15, PRN 16,  PRN 17, PRN 18, PRN 19, PRN 20, PRN 21, PRN 22, PRN 23, PRN 24     cholecalciferol, vitamin D3, 1,000 unit tablet Take 1,000 Units by mouth daily.     cyanocobalamin 1000 MCG tablet Take 1,000 mcg by mouth daily.     docusate sodium (COLACE) 50 mg/5 mL oral liquid Take 100 mg  by mouth 2 (two) times a day.      epoetin delgado (EPOGEN,PROCRIT) 20,000 unit/mL injection Inject 7,500 Units under the skin once a week in the evening. Hold if HGB greater than 9. Check HGB once a week  For 4 weeks on Monday-     Medication once a day on Monday and Thursday     magnesium oxide (MAG-OX) 400 mg tablet Take 400 mg by mouth 3 (three) times a day.      melatonin 3 mg Tab tablet Take 3 mg by mouth at bedtime. May have another 3 mg PRN if needed     metoprolol tartrate (LOPRESSOR) 50 MG tablet Take 50 mg by mouth 2 (two) times a day. Special Instructions: Hold for SBP <80  Twice A Day; 08:00 AM, 08:00 PM      multivitamin with minerals (THERA-M) 9 mg iron-400 mcg Tab tablet Take 1 tablet by mouth daily.     omeprazole (PRILOSEC OTC) 20 MG tablet Take 20 mg by mouth daily before breakfast.     ondansetron (ZOFRAN) 4 MG tablet Take 4 mg by mouth every 8 (eight) hours as needed for nausea.      polyethylene glycol (MIRALAX) 17 gram packet Take 17 g by mouth daily. Special Instructions: First choice for everyday PRN constipation.  Once A Day - PRN; PRN 1     potassium chloride (KLOR-CON) 20 mEq packet Take 40 mEq by mouth daily.     senna (SENOKOT) 8.6 mg tablet Take 2 tablets by mouth 2 (two) times a day.      simvastatin (ZOCOR) 20 MG tablet Take 20 mg by mouth at bedtime.      ASSESSMENT:      ICD-10-CM    1. Essential hypertension I10    2. Acute on chronic diastolic congestive heart failure I50.33    3. Stage 3 chronic kidney disease N18.3    4. Idiopathic gout M10.00    5. Anemia of chronic renal failure, unspecified stage N18.9     D63.1        PLAN:    Continue to optimize therapy  Anemia weekly HGB,  on Epogen for HGB < 9 twice weekly  Cognition appeared intact- Pt to have cognitive testing in facility.   HTN stable on metoprolol BP's stable and no longer tachy      Electronically signed by: Alice Ashton, CNP

## 2021-06-16 PROBLEM — I50.33 ACUTE ON CHRONIC DIASTOLIC CONGESTIVE HEART FAILURE (H): Status: ACTIVE | Noted: 2017-10-05

## 2025-01-25 ENCOUNTER — APPOINTMENT (OUTPATIENT)
Dept: CT IMAGING | Facility: HOSPITAL | Age: 75
End: 2025-01-25
Attending: EMERGENCY MEDICINE
Payer: COMMERCIAL

## 2025-01-25 ENCOUNTER — HOSPITAL ENCOUNTER (EMERGENCY)
Facility: HOSPITAL | Age: 75
Discharge: HOME | End: 2025-01-25
Attending: EMERGENCY MEDICINE | Admitting: EMERGENCY MEDICINE
Payer: COMMERCIAL

## 2025-01-25 VITALS
BODY MASS INDEX: 37.22 KG/M2 | SYSTOLIC BLOOD PRESSURE: 118 MMHG | HEIGHT: 70 IN | TEMPERATURE: 97.8 F | OXYGEN SATURATION: 94 % | RESPIRATION RATE: 16 BRPM | HEART RATE: 104 BPM | WEIGHT: 260 LBS | DIASTOLIC BLOOD PRESSURE: 75 MMHG

## 2025-01-25 DIAGNOSIS — E83.42 HYPOMAGNESEMIA: ICD-10-CM

## 2025-01-25 DIAGNOSIS — K11.1 SALIVARY GLAND ENLARGEMENT: ICD-10-CM

## 2025-01-25 DIAGNOSIS — E87.1 HYPONATREMIA: ICD-10-CM

## 2025-01-25 DIAGNOSIS — D69.6 THROMBOCYTOPENIA: ICD-10-CM

## 2025-01-25 DIAGNOSIS — R10.84 GENERALIZED ABDOMINAL PAIN: ICD-10-CM

## 2025-01-25 LAB
ALBUMIN SERPL BCG-MCNC: 4.1 G/DL (ref 3.5–5.2)
ALBUMIN UR-MCNC: NEGATIVE MG/DL
ALP SERPL-CCNC: 79 U/L (ref 40–150)
ALT SERPL W P-5'-P-CCNC: 29 U/L (ref 0–70)
ANION GAP SERPL CALCULATED.3IONS-SCNC: 12 MMOL/L (ref 7–15)
APPEARANCE UR: CLEAR
AST SERPL W P-5'-P-CCNC: 23 U/L (ref 0–45)
BACTERIA #/AREA URNS HPF: ABNORMAL /HPF
BASOPHILS # BLD AUTO: 0 10E3/UL (ref 0–0.2)
BASOPHILS NFR BLD AUTO: 0 %
BILIRUB DIRECT SERPL-MCNC: 0.34 MG/DL (ref 0–0.3)
BILIRUB SERPL-MCNC: 1.2 MG/DL
BILIRUB UR QL STRIP: NEGATIVE
BUN SERPL-MCNC: 13.7 MG/DL (ref 8–23)
CALCIUM SERPL-MCNC: 9.5 MG/DL (ref 8.8–10.4)
CHLORIDE SERPL-SCNC: 93 MMOL/L (ref 98–107)
COLOR UR AUTO: COLORLESS
CREAT SERPL-MCNC: 1.15 MG/DL (ref 0.67–1.17)
CRP SERPL-MCNC: 42.4 MG/L
EGFRCR SERPLBLD CKD-EPI 2021: 67 ML/MIN/1.73M2
EOSINOPHIL # BLD AUTO: 0.1 10E3/UL (ref 0–0.7)
EOSINOPHIL NFR BLD AUTO: 1 %
ERYTHROCYTE [DISTWIDTH] IN BLOOD BY AUTOMATED COUNT: 13.3 % (ref 10–15)
FLUAV RNA SPEC QL NAA+PROBE: NEGATIVE
FLUBV RNA RESP QL NAA+PROBE: NEGATIVE
GLUCOSE SERPL-MCNC: 128 MG/DL (ref 70–99)
GLUCOSE UR STRIP-MCNC: NEGATIVE MG/DL
HCO3 SERPL-SCNC: 25 MMOL/L (ref 22–29)
HCT VFR BLD AUTO: 45 % (ref 40–53)
HGB BLD-MCNC: 15.3 G/DL (ref 13.3–17.7)
HGB UR QL STRIP: NEGATIVE
IMM GRANULOCYTES # BLD: 0 10E3/UL
IMM GRANULOCYTES NFR BLD: 0 %
KETONES UR STRIP-MCNC: NEGATIVE MG/DL
LEUKOCYTE ESTERASE UR QL STRIP: NEGATIVE
LIPASE SERPL-CCNC: 23 U/L (ref 13–60)
LYMPHOCYTES # BLD AUTO: 0.8 10E3/UL (ref 0.8–5.3)
LYMPHOCYTES NFR BLD AUTO: 9 %
MAGNESIUM SERPL-MCNC: 1.4 MG/DL (ref 1.7–2.3)
MCH RBC QN AUTO: 34.5 PG (ref 26.5–33)
MCHC RBC AUTO-ENTMCNC: 34 G/DL (ref 31.5–36.5)
MCV RBC AUTO: 101 FL (ref 78–100)
MONOCYTES # BLD AUTO: 0.6 10E3/UL (ref 0–1.3)
MONOCYTES NFR BLD AUTO: 7 %
NEUTROPHILS # BLD AUTO: 7.1 10E3/UL (ref 1.6–8.3)
NEUTROPHILS NFR BLD AUTO: 82 %
NITRATE UR QL: NEGATIVE
NRBC # BLD AUTO: 0 10E3/UL
NRBC BLD AUTO-RTO: 0 /100
NT-PROBNP SERPL-MCNC: 160 PG/ML (ref 0–900)
PH UR STRIP: 6.5 [PH] (ref 5–7)
PLATELET # BLD AUTO: 110 10E3/UL (ref 150–450)
POTASSIUM SERPL-SCNC: 5 MMOL/L (ref 3.4–5.3)
PROT SERPL-MCNC: 7.4 G/DL (ref 6.4–8.3)
RBC # BLD AUTO: 4.44 10E6/UL (ref 4.4–5.9)
RBC URINE: <1 /HPF
RSV RNA SPEC NAA+PROBE: NEGATIVE
SARS-COV-2 RNA RESP QL NAA+PROBE: NEGATIVE
SODIUM SERPL-SCNC: 130 MMOL/L (ref 135–145)
SP GR UR STRIP: 1.02 (ref 1–1.03)
SQUAMOUS EPITHELIAL: <1 /HPF
TROPONIN T SERPL HS-MCNC: 17 NG/L
TROPONIN T SERPL HS-MCNC: 17 NG/L
UROBILINOGEN UR STRIP-MCNC: <2 MG/DL
WBC # BLD AUTO: 8.6 10E3/UL (ref 4–11)
WBC URINE: 1 /HPF

## 2025-01-25 PROCEDURE — 84484 ASSAY OF TROPONIN QUANT: CPT | Performed by: EMERGENCY MEDICINE

## 2025-01-25 PROCEDURE — 83735 ASSAY OF MAGNESIUM: CPT | Performed by: EMERGENCY MEDICINE

## 2025-01-25 PROCEDURE — 80053 COMPREHEN METABOLIC PANEL: CPT | Performed by: EMERGENCY MEDICINE

## 2025-01-25 PROCEDURE — 84450 TRANSFERASE (AST) (SGOT): CPT | Performed by: EMERGENCY MEDICINE

## 2025-01-25 PROCEDURE — 87637 SARSCOV2&INF A&B&RSV AMP PRB: CPT | Performed by: EMERGENCY MEDICINE

## 2025-01-25 PROCEDURE — 96365 THER/PROPH/DIAG IV INF INIT: CPT

## 2025-01-25 PROCEDURE — 70491 CT SOFT TISSUE NECK W/DYE: CPT

## 2025-01-25 PROCEDURE — 99285 EMERGENCY DEPT VISIT HI MDM: CPT | Mod: 25

## 2025-01-25 PROCEDURE — 81001 URINALYSIS AUTO W/SCOPE: CPT | Performed by: EMERGENCY MEDICINE

## 2025-01-25 PROCEDURE — 85025 COMPLETE CBC W/AUTO DIFF WBC: CPT | Performed by: EMERGENCY MEDICINE

## 2025-01-25 PROCEDURE — 36415 COLL VENOUS BLD VENIPUNCTURE: CPT | Performed by: EMERGENCY MEDICINE

## 2025-01-25 PROCEDURE — 74177 CT ABD & PELVIS W/CONTRAST: CPT

## 2025-01-25 PROCEDURE — 83880 ASSAY OF NATRIURETIC PEPTIDE: CPT | Performed by: EMERGENCY MEDICINE

## 2025-01-25 PROCEDURE — 86140 C-REACTIVE PROTEIN: CPT | Performed by: EMERGENCY MEDICINE

## 2025-01-25 PROCEDURE — 80048 BASIC METABOLIC PNL TOTAL CA: CPT | Performed by: EMERGENCY MEDICINE

## 2025-01-25 PROCEDURE — 93005 ELECTROCARDIOGRAM TRACING: CPT | Performed by: EMERGENCY MEDICINE

## 2025-01-25 PROCEDURE — 250N000011 HC RX IP 250 OP 636: Performed by: EMERGENCY MEDICINE

## 2025-01-25 PROCEDURE — 82248 BILIRUBIN DIRECT: CPT | Performed by: EMERGENCY MEDICINE

## 2025-01-25 PROCEDURE — 83690 ASSAY OF LIPASE: CPT | Performed by: EMERGENCY MEDICINE

## 2025-01-25 RX ORDER — MAGNESIUM SULFATE HEPTAHYDRATE 40 MG/ML
2 INJECTION, SOLUTION INTRAVENOUS ONCE
Status: COMPLETED | OUTPATIENT
Start: 2025-01-25 | End: 2025-01-25

## 2025-01-25 RX ORDER — IOPAMIDOL 755 MG/ML
90 INJECTION, SOLUTION INTRAVASCULAR ONCE
Status: COMPLETED | OUTPATIENT
Start: 2025-01-25 | End: 2025-01-25

## 2025-01-25 RX ADMIN — MAGNESIUM SULFATE HEPTAHYDRATE 2 G: 40 INJECTION, SOLUTION INTRAVENOUS at 17:15

## 2025-01-25 RX ADMIN — IOPAMIDOL 90 ML: 755 INJECTION, SOLUTION INTRAVENOUS at 18:06

## 2025-01-25 ASSESSMENT — ACTIVITIES OF DAILY LIVING (ADL)
ADLS_ACUITY_SCORE: 41

## 2025-01-25 NOTE — ED TRIAGE NOTES
Pt has swollen lymph node in R side of neck that started 3 days ago, shortly after he noticed numbness in his fingertips. Pt also complains of LLQ pain.

## 2025-01-25 NOTE — ED PROVIDER NOTES
EMERGENCY DEPARTMENT ENCOUNTER      NAME: Shai Gibbs  AGE: 74 year old male  YOB: 1950  MRN: [id number]  EVALUATION DATE & TIME: No admission date for patient encounter.    PCP: [facility] [place]    ED PROVIDER: Julia Ponce M.D.      CHIEF COMPLAINT     Chief Complaint   Patient presents with    Abdominal Pain    finger numbness    Lymphadenopathy         FINAL IMPRESSION:     1. Generalized abdominal pain    2. Salivary gland enlargement    3. Hypomagnesemia    4. Thrombocytopenia    5. Hyponatremia          MEDICAL DECISION MAKING:     ED Course as of 01/25/25 2002   Sat Jan 25, 2025   1517 Mr. Gibbs is a 74-year-old male who presents here to the emergency department by himself.  Patient has multiple complaints.  First he noticed swelling on the right side of the lymph nodes a few days ago.  This is not associated with difficulty swallowing and noticed that the swelling has gotten better.  He also noticed that the tip of his fingers feel painful numb I ask him if he felt like when he was outside in the cold and he stated yes.  No trauma.  He also stated that he is been having real bilateral lower abdominal pain.   1518 He was recently seen by his nephrologist today had blood work done that was normal.  Finish antibiotics for UTI recently.  States he is always short of breath denies any chest pain no vomiting some diarrhea no hematuria no leg swelling or rashes.   1518 Examination patient is well-appearing and in no distress.  He has an elevated BMI.  He walks with a cane.  There is no increased work of breathing no retractions accessory muscle use no stridor.  Trachea is midline.  Uvula is midline.  There is no posterior erythema edema or trismus.  There is edema on the right submandibular area.  There is no evidence of Ludewig's angina the trachea is midline.  Does have a movable mass on the right angle of the jaw.  Nontender nonerythematous.  Cardiopulmonary regular rhythm.   No wheezes.  Abdomen protuberant surgical scar complains of pain in the lower abdomen bilaterally without guarding or rebound extremities without edema.   1519 Differential diagnosis includes but not limited to malignancy salivary gland stone mumps among others.  Will check labs.  Did numbness tingling in the fingertips there is no focal neurological deficits.  He states he supposed to be on magnesium we will recheck a magnesium and a potassium.  For the abdominal pain will check labs has benign abdominal exam.   1552 EKG reviewed by me hyperacute T waves throughout compared to previous EKG no significant changes.   1644 Hypomagnesemia he said he had some tingling before with low magnesium will replace.  Sodium is 130 glucose of 128 normal BUN and creatinine.  Normal liver functions influenza RSV were negative.   1644 Normal white cell count and hemoglobin.  Platelets of 110.   1645 Elevated CRP of 42.4.   1645 Will image CT neck and CT abdomen.   1752 Pulmonary Follow Up Visit:  The patient is a  74 y.o. old male who returns in follow up of paraseptal/centrilobular emphysema with bullous disease, lung nodules,  sarcoidosis accompanied by hypercalcemia controlled on 5 mg prednisone daily followed by Dr. Mills. Other significant comorbidities include recently diagnosed prediabetes,  thoracic aortic aneurysm followed by Dr. Cotton, HL,  CKD, s/p AAA repair, hypertension, S/P right pleurodesis for recurrent right pneumothorax, chronic chest wall wound now healed and s/p nasal surgery with placement of Carreon nasal splints.    He was ill with a flu-like illnesses past month for 10 days followed by a urinary tract infection and thus has been less active.  He would like to resume his daily walks of 30 minutes.    He continues on Incruse 1 inhalation daily which she has found beneficial. He has no complaints of shortness of breath.  He is up-to-date with vaccinations including pneumococcal, RSV, COVID in influenza.          1753 Labs sodium 130 normal potassium creatinine of 1.15 influenza COVID are negative lipase is normal BNP is normal slightly low magnesium will replace.  Elevated CRP normal white blood cell count hemoglobin and liver function test troponin within normal limits for gender.   1754 Patient reevaluated and updated.   1815 Patient.  The swelling is gone.  Now does have equal to mandibular lymphadenopathy but the previous swelling is gone and when I put my hand on his submandibular area now there is no edema likely ciliary gland stone that he passed.  No longer has left abdominal pain feels more bloated on the right side.  Awaiting CT given something to eat.   1816 Patient noticed that the finger sensation which he describes as pain is now better after the magnesium.   1830 Ct Abdomen and pelvis independently interpreted by me reveals no free air.  Formal read by radiologist.   1944 CT abdomen pelvis and CT neck no acute abnormalities will update patient   1958 Discussed with patient his symptoms again abdominal pain pain on all of the fingers morning bilaterally.  And the mask which not resolved.   1958 The mass is likely related to a sialolith is currently resolved and CT is negative.   1958 Possible that the difficulty pain in the tip of the finger secondary to   1958  hypomagnesemia was mild but patient reported improvement after magnesium replacement he has magnesium at home   1958 CT abdomen and pelvis revealed no obstruction vascular repair of the aorta.  He has good pulses.   1959 Given patient comorbidity we discussed admission for observation he does not want to stay.  He has medications at home.  Encouraged him to call primary care doctor for follow-up on Monday and return for any concerns.    Patient was able to eat in the emergency department without any distress.  Does not appear to be symptomatic from the slightly low sodium.  Will get him to follow-up primary care doctor for hyponatremia hypomagnesemia  slightly low platelets.   1959 Felt comfortable with this plan discharge ambulatory uses a cane in stable condition.       Medical Decision Making    History:  Supplemental history from: None  External Record(s) reviewed: [facility] 1/22/2025 sarcoidosis bollous emphysema    Work Up:  Chart documentation includes differential considered and any EKGs or imaging independently interpreted by provider, where specified.  In additional to work up documented, I considered the following work up: MRI to evaluate stroke.  There is no weakness.  The tip of the finger and the experience was pain.    External consultation:  Discussion of management with another provider: None    Complicating factors:  Care impacted by chronic illness: Chronic kidney disease sarcoidosis      Disposition considerations: Discharge. I recommended the patient continue their current prescription strength medication(s): Magnesium.. See documentation for any additional details.    Discussed with patient admission.  He declines.  MIPS Not Applicable        ED COURSE     3:04 PM Met with patient for initial interview and exam.   4:46 PM Updated patient.  7:47 PM Patient updated after imaging.      At the conclusion of the encounter I discussed the results of all of the tests and the disposition. The questions were answered. The patient acknowledged understanding and was agreeable with the care plan.       MEDICATIONS GIVEN IN THE EMERGENCY:     Medications   magnesium sulfate 2 g in 50 mL sterile water intermittent infusion (0 g Intravenous Stopped 1/25/25 1841)   iopamidol (ISOVUE-370) solution 90 mL (90 mLs Intravenous $Given 1/25/25 1806)       NEW PRESCRIPTIONS STARTED AT TODAY'S ER VISIT     Discharge Medication List as of 1/25/2025  7:53 PM             =================================================================    HPI     Patient information was obtained from: Patient    Use of : N/A        Shai Gibbs is a 74 year old male  who presents by walk in for evaluation of low abdominal pain, finger numbness, and lymphadenopathy. Patient noticed his right lymph node swollen 3 days ago and it is accompanied with pain. States swelling has been getting gradually better today. Today, he noticed onset of new finger numbness in all fingers. He is experiencing a headache and some shortness of breath, not new to patient at this time. Denies fever, chills, ENT symptoms, chest pain, or stroke-like symptoms.     Patient also notes low abdominal pain for about 1 month. States he was seen and started on 2 rounds of antibiotics since. He finished both rounds. Reports a possible kidney stone per his kidney doctor. No hematuria or melena.       REVIEW OF SYSTEMS   Review of Systems   SEE HPI    PAST MEDICAL HISTORY:   No past medical history on file.    PAST SURGICAL HISTORY:     Past Surgical History:   Procedure Laterality Date    COLECTOMY PARTIAL      Diverticulitis    PEG TUBE PLACEMENT      RELEASE CARPAL TUNNEL Bilateral     TRACHEOSTOMY           CURRENT MEDICATIONS:   allopurinol (ZYLOPRIM) 300 MG tablet  bumetanide (BUMEX) 1 MG tablet  calcium acetate (PHOSLYRA) 667 mg (169 mg calcium)/5 mL Soln solution  cholecalciferol, vitamin D3, 1,000 unit tablet  cyanocobalamin 1000 MCG tablet  magnesium oxide (MAG-OX) 400 mg tablet  metoprolol tartrate (LOPRESSOR) 50 MG tablet  multivitamin with minerals (THERA-M) 9 mg iron-400 mcg Tab tablet  polyethylene glycol (MIRALAX) 17 gram packet  potassium chloride (KLOR-CON) 20 mEq packet  simvastatin (ZOCOR) 20 MG tablet         ALLERGIES:   No Known Allergies    FAMILY HISTORY:   No family history on file.    SOCIAL HISTORY:     Social History     Socioeconomic History    Marital status:    Tobacco Use    Smoking status: Never    Smokeless tobacco: Never   Substance and Sexual Activity    Alcohol use: No   Social History Narrative     with 3 adult children.  Adult son lives with father and mother in  "split level home with 12 steps.       VITALS:   /75   Pulse 104   Temp 97.8  F (36.6  C)   Resp 16   Ht 1.778 m (5' 10\")   Wt 117.9 kg (260 lb)   SpO2 94%   BMI 37.31 kg/m      PHYSICAL EXAM     Physical Exam  Vitals and nursing note reviewed. Exam conducted with a chaperone present.   Constitutional:       General: He is not in acute distress.     Appearance: He is obese. He is not ill-appearing, toxic-appearing or diaphoretic.   HENT:      Head:        Comments: Overall nontender mass in the mid angle of the jaw.  No erythema no edema trachea is midline.  Neurological:      GCS: GCS eye subscore is 4. GCS verbal subscore is 5. GCS motor subscore is 6.      Cranial Nerves: Cranial nerves 2-12 are intact.      Sensory: Sensation is intact.      Motor: Motor function is intact.      Comments: Walks with a cane. No focal deficit         Physical Exam   Constitutional: Well appearing. Nontoxic.     Head: Atraumatic.     Nose: Nose normal.     Mouth/Throat: Oropharynx is clear and moist.     Eyes: EOM are normal. Pupils are equal, round, and reactive to light.     Ears: Bilateral pearly white tympanic membranes.    Neck: Normal range of motion. Neck supple. Right submandibular lymph node.     Cardiovascular: Normal rate, regular rhythm and normal heart sounds.  2+ femoral pulses/radial/DP pulses B    Pulmonary/Chest: Normal effort  and breath sounds normal.     Abdominal: soft nontender. Midline cervical scar.     Musculoskeletal: Normal range of motion.     Neurological: Moves upper and lower extremities equally.  Oriented x 3.  Gait is steady walks with a cane.    Lymphatics: no edema, no calves pain, no palpable cords.    : NA    Skin: Skin is warm and dry.     Psychiatric: Normal mood and affect. Behavior is normal.       LAB:     All pertinent labs reviewed and interpreted.  Labs Ordered and Resulted from Time of ED Arrival to Time of ED Departure   BASIC METABOLIC PANEL - Abnormal       Result Value "    Sodium 130 (*)     Potassium 5.0      Chloride 93 (*)     Carbon Dioxide (CO2) 25      Anion Gap 12      Urea Nitrogen 13.7      Creatinine 1.15      GFR Estimate 67      Calcium 9.5      Glucose 128 (*)    HEPATIC FUNCTION PANEL - Abnormal    Protein Total 7.4      Albumin 4.1      Bilirubin Total 1.2      Alkaline Phosphatase 79      AST 23      ALT 29      Bilirubin Direct 0.34 (*)    ROUTINE UA WITH MICROSCOPIC REFLEX TO CULTURE - Abnormal    Color Urine Colorless      Appearance Urine Clear      Glucose Urine Negative      Bilirubin Urine Negative      Ketones Urine Negative      Specific Gravity Urine 1.016      Blood Urine Negative      pH Urine 6.5      Protein Albumin Urine Negative      Urobilinogen Urine <2.0      Nitrite Urine Negative      Leukocyte Esterase Urine Negative      Bacteria Urine Few (*)     RBC Urine <1      WBC Urine 1      Squamous Epithelials Urine <1     MAGNESIUM - Abnormal    Magnesium 1.4 (*)    CRP INFLAMMATION - Abnormal    CRP Inflammation 42.40 (*)    CBC WITH PLATELETS AND DIFFERENTIAL - Abnormal    WBC Count 8.6      RBC Count 4.44      Hemoglobin 15.3      Hematocrit 45.0       (*)     MCH 34.5 (*)     MCHC 34.0      RDW 13.3      Platelet Count 110 (*)     % Neutrophils 82      % Lymphocytes 9      % Monocytes 7      % Eosinophils 1      % Basophils 0      % Immature Granulocytes 0      NRBCs per 100 WBC 0      Absolute Neutrophils 7.1      Absolute Lymphocytes 0.8      Absolute Monocytes 0.6      Absolute Eosinophils 0.1      Absolute Basophils 0.0      Absolute Immature Granulocytes 0.0      Absolute NRBCs 0.0     LIPASE - Normal    Lipase 23     INFLUENZA A/B, RSV AND SARS-COV2 PCR - Normal    Influenza A PCR Negative      Influenza B PCR Negative      RSV PCR Negative      SARS CoV2 PCR Negative     TROPONIN T, HIGH SENSITIVITY - Normal    Troponin T, High Sensitivity 17     NT PROBNP INPATIENT - Normal    N terminal Pro BNP Inpatient 160     TROPONIN T, HIGH  SENSITIVITY - Normal    Troponin T, High Sensitivity 17          RADIOLOGY:     Reviewed all pertinent imaging. Please see official radiology report.  Soft tissue neck CT w contrast   Final Result   IMPRESSION:    1.  Normal soft tissue neck CT. No abnormal mass or fluid collection.      CT Abdomen Pelvis w Contrast   Final Result   IMPRESSION:    1.  No bowel obstruction. Status post partial colectomy. There is colonic diverticulosis without diverticulitis.   2.  Nonobstructing renal calculi. No ureteral calculi. No hydronephrosis or hydroureter.   3.  Atherosclerotic disease including coronary artery disease. Status post abdominal aorta              EKG:     EKG #1  Sinus rhythm normal anterior progression normal axis large peaked T waves throughout.    Time:[id number]    Ventricular rate 68 bmp  Axis normal  TX interval 168 ms  QRS duration 84 ms  QT//408ms    Compared to previous EKG on December 28, 2011 hyperacute T waves seen before.  Rate 61 no significant changes    I have independently reviewed and interpreted the EKG(s) documented above.      PROCEDURES:     Procedures      I, Celestina Dowell, am serving as a scribe to document services personally performed by Dr. Ponce based on my observation and the provider's statements to me. I, Julia Ponce MD attest that Celestina Dowell is acting in a scribe capacity, has observed my performance of the services and has documented them in accordance with my direction.    Julia Ponce M.D.  Emergency Medicine  [facility] EMERGENCY DEPARTMENT  [facility] [facility] [address]  [phone]  Dept: [phone]       Julia Ponce MD  01/25/25 2002

## 2025-01-26 NOTE — DISCHARGE INSTRUCTIONS
Read and follow the discharge instructions.    Your magnesium was low and is possible that might be causing some of the discomfort in the tip of your fingers make sure you continue taking the magnesium supplements.    Your Platelets were low they have been low before make sure you follow-up with your primary care doctor.    Your Sodium was also low.  Make sure you follow-up with your primary care doctor.    It is possible that you had a stone in your salivary gland or given that the swelling went away on its own.  Stay well-hydrated.  This will prevent it.  Occasionally you could repeat a level to increase the salivation.    Call your primary care doctor on Monday to make a follow-up appointment.    Return for chest pain, shortness of breath, abdominal pain, fever vomiting or any other concerns.

## 2025-01-27 LAB
ATRIAL RATE - MUSE: 68 BPM
DIASTOLIC BLOOD PRESSURE - MUSE: NORMAL MMHG
INTERPRETATION ECG - MUSE: NORMAL
P AXIS - MUSE: 113 DEGREES
PR INTERVAL - MUSE: 168 MS
QRS DURATION - MUSE: 84 MS
QT - MUSE: 384 MS
QTC - MUSE: 408 MS
R AXIS - MUSE: 24 DEGREES
SYSTOLIC BLOOD PRESSURE - MUSE: NORMAL MMHG
T AXIS - MUSE: 50 DEGREES
VENTRICULAR RATE- MUSE: 68 BPM